# Patient Record
Sex: FEMALE | Race: BLACK OR AFRICAN AMERICAN | NOT HISPANIC OR LATINO | Employment: PART TIME | URBAN - METROPOLITAN AREA
[De-identification: names, ages, dates, MRNs, and addresses within clinical notes are randomized per-mention and may not be internally consistent; named-entity substitution may affect disease eponyms.]

---

## 2022-07-08 ENCOUNTER — OFFICE VISIT (OUTPATIENT)
Dept: FAMILY MEDICINE CLINIC | Facility: CLINIC | Age: 22
End: 2022-07-08

## 2022-07-08 ENCOUNTER — LAB (OUTPATIENT)
Dept: LAB | Facility: HOSPITAL | Age: 22
End: 2022-07-08

## 2022-07-08 VITALS
DIASTOLIC BLOOD PRESSURE: 70 MMHG | HEIGHT: 63 IN | WEIGHT: 151.2 LBS | OXYGEN SATURATION: 99 % | HEART RATE: 55 BPM | TEMPERATURE: 98.2 F | RESPIRATION RATE: 16 BRPM | SYSTOLIC BLOOD PRESSURE: 110 MMHG | BODY MASS INDEX: 26.79 KG/M2

## 2022-07-08 DIAGNOSIS — N92.0 MENORRHAGIA WITH REGULAR CYCLE: ICD-10-CM

## 2022-07-08 DIAGNOSIS — F51.04 PSYCHOPHYSIOLOGICAL INSOMNIA: ICD-10-CM

## 2022-07-08 DIAGNOSIS — Z76.89 ESTABLISHING CARE WITH NEW DOCTOR, ENCOUNTER FOR: Primary | ICD-10-CM

## 2022-07-08 DIAGNOSIS — F34.1 DYSTHYMIA: ICD-10-CM

## 2022-07-08 DIAGNOSIS — F41.9 ANXIETY: ICD-10-CM

## 2022-07-08 DIAGNOSIS — Z11.59 NEED FOR HEPATITIS C SCREENING TEST: ICD-10-CM

## 2022-07-08 DIAGNOSIS — Z00.00 ANNUAL PHYSICAL EXAM: ICD-10-CM

## 2022-07-08 LAB
ALBUMIN SERPL-MCNC: 4.8 G/DL (ref 3.5–5.2)
ALBUMIN/GLOB SERPL: 1.7 G/DL
ALP SERPL-CCNC: 52 U/L (ref 39–117)
ALT SERPL W P-5'-P-CCNC: 24 U/L (ref 1–33)
ANION GAP SERPL CALCULATED.3IONS-SCNC: 9.4 MMOL/L (ref 5–15)
AST SERPL-CCNC: 45 U/L (ref 1–32)
BASOPHILS # BLD AUTO: 0.04 10*3/MM3 (ref 0–0.2)
BASOPHILS NFR BLD AUTO: 0.8 % (ref 0–1.5)
BILIRUB SERPL-MCNC: 0.3 MG/DL (ref 0–1.2)
BILIRUB UR QL STRIP: NEGATIVE
BUN SERPL-MCNC: 8 MG/DL (ref 6–20)
BUN/CREAT SERPL: 11.3 (ref 7–25)
CALCIUM SPEC-SCNC: 9.6 MG/DL (ref 8.6–10.5)
CHLORIDE SERPL-SCNC: 103 MMOL/L (ref 98–107)
CHOLEST SERPL-MCNC: 178 MG/DL (ref 0–200)
CLARITY UR: ABNORMAL
CO2 SERPL-SCNC: 24.6 MMOL/L (ref 22–29)
COLOR UR: YELLOW
CREAT SERPL-MCNC: 0.71 MG/DL (ref 0.57–1)
DEPRECATED RDW RBC AUTO: 45.3 FL (ref 37–54)
EGFRCR SERPLBLD CKD-EPI 2021: 125 ML/MIN/1.73
EOSINOPHIL # BLD AUTO: 0.08 10*3/MM3 (ref 0–0.4)
EOSINOPHIL NFR BLD AUTO: 1.6 % (ref 0.3–6.2)
ERYTHROCYTE [DISTWIDTH] IN BLOOD BY AUTOMATED COUNT: 15.1 % (ref 12.3–15.4)
GLOBULIN UR ELPH-MCNC: 2.8 GM/DL
GLUCOSE SERPL-MCNC: 76 MG/DL (ref 65–99)
GLUCOSE UR STRIP-MCNC: NEGATIVE MG/DL
HCT VFR BLD AUTO: 38.4 % (ref 34–46.6)
HCV AB SER DONR QL: NORMAL
HDLC SERPL-MCNC: 67 MG/DL (ref 40–60)
HGB BLD-MCNC: 12.8 G/DL (ref 12–15.9)
HGB UR QL STRIP.AUTO: NEGATIVE
IMM GRANULOCYTES # BLD AUTO: 0.01 10*3/MM3 (ref 0–0.05)
IMM GRANULOCYTES NFR BLD AUTO: 0.2 % (ref 0–0.5)
IRON 24H UR-MRATE: 26 MCG/DL (ref 37–145)
KETONES UR QL STRIP: NEGATIVE
LDLC SERPL CALC-MCNC: 102 MG/DL (ref 0–100)
LDLC/HDLC SERPL: 1.53 {RATIO}
LEUKOCYTE ESTERASE UR QL STRIP.AUTO: NEGATIVE
LYMPHOCYTES # BLD AUTO: 1.99 10*3/MM3 (ref 0.7–3.1)
LYMPHOCYTES NFR BLD AUTO: 39.2 % (ref 19.6–45.3)
MCH RBC QN AUTO: 28.1 PG (ref 26.6–33)
MCHC RBC AUTO-ENTMCNC: 33.3 G/DL (ref 31.5–35.7)
MCV RBC AUTO: 84.4 FL (ref 79–97)
MONOCYTES # BLD AUTO: 0.49 10*3/MM3 (ref 0.1–0.9)
MONOCYTES NFR BLD AUTO: 9.6 % (ref 5–12)
NEUTROPHILS NFR BLD AUTO: 2.47 10*3/MM3 (ref 1.7–7)
NEUTROPHILS NFR BLD AUTO: 48.6 % (ref 42.7–76)
NITRITE UR QL STRIP: NEGATIVE
NRBC BLD AUTO-RTO: 0 /100 WBC (ref 0–0.2)
PH UR STRIP.AUTO: 6 [PH] (ref 5–8)
PLATELET # BLD AUTO: 291 10*3/MM3 (ref 140–450)
PMV BLD AUTO: 9.6 FL (ref 6–12)
POTASSIUM SERPL-SCNC: 4.1 MMOL/L (ref 3.5–5.2)
PROT SERPL-MCNC: 7.6 G/DL (ref 6–8.5)
PROT UR QL STRIP: NEGATIVE
RBC # BLD AUTO: 4.55 10*6/MM3 (ref 3.77–5.28)
SODIUM SERPL-SCNC: 137 MMOL/L (ref 136–145)
SP GR UR STRIP: 1.02 (ref 1–1.03)
TRIGL SERPL-MCNC: 43 MG/DL (ref 0–150)
TSH SERPL DL<=0.05 MIU/L-ACNC: 1.94 UIU/ML (ref 0.27–4.2)
UROBILINOGEN UR QL STRIP: ABNORMAL
VLDLC SERPL-MCNC: 9 MG/DL (ref 5–40)
WBC NRBC COR # BLD: 5.08 10*3/MM3 (ref 3.4–10.8)

## 2022-07-08 PROCEDURE — 83540 ASSAY OF IRON: CPT | Performed by: NURSE PRACTITIONER

## 2022-07-08 PROCEDURE — 81003 URINALYSIS AUTO W/O SCOPE: CPT | Performed by: NURSE PRACTITIONER

## 2022-07-08 PROCEDURE — 99385 PREV VISIT NEW AGE 18-39: CPT | Performed by: NURSE PRACTITIONER

## 2022-07-08 PROCEDURE — 80061 LIPID PANEL: CPT | Performed by: NURSE PRACTITIONER

## 2022-07-08 PROCEDURE — 86803 HEPATITIS C AB TEST: CPT | Performed by: NURSE PRACTITIONER

## 2022-07-08 PROCEDURE — 80050 GENERAL HEALTH PANEL: CPT | Performed by: NURSE PRACTITIONER

## 2022-07-08 RX ORDER — CITALOPRAM 10 MG/1
10 TABLET ORAL EVERY MORNING
Qty: 30 TABLET | Refills: 1 | Status: SHIPPED | OUTPATIENT
Start: 2022-07-08 | End: 2022-08-17 | Stop reason: SDUPTHER

## 2022-07-08 RX ORDER — HYDROXYZINE HYDROCHLORIDE 25 MG/1
25 TABLET, FILM COATED ORAL NIGHTLY PRN
Qty: 30 TABLET | Refills: 2 | Status: SHIPPED | OUTPATIENT
Start: 2022-07-08 | End: 2022-10-25 | Stop reason: SDUPTHER

## 2022-07-08 NOTE — PROGRESS NOTES
Follow Up Office Note     Patient Name: Saba Brown  : 2000   MRN: [id number]     Chief Complaint:    Chief Complaint   Patient presents with   • Establish Care     Pt wants to discuss medication. SERTRALINE HCL       History of Present Illness: Saba Brown is a 21 y.o. female who presents today to establish care with a new PCP and for annual physical exam.  Patient has chronic anxiety and depression. She states that her previous PCP prescribed Sertraline for her this past November. She states that she started at 50mg/day and then it was increased to 100 mg/day 4 weeks later. Patient states that the Sertraline made her stomach hurt and she was unable to eat so she discontinued taking it. She would like to discuss trying a different medication to treat her symptoms.   Patient also c/o difficulty sleeping.      Subjective      Review of Systems:   Review of Systems   Constitutional: Negative.    HENT: Negative.         Last dental exam last week.   Eyes: Negative.         Exam exam up to date.   Respiratory: Negative.    Cardiovascular: Negative.    Gastrointestinal: Negative.    Genitourinary: Positive for menstrual problem (heavy menses with regular cycle). Negative for dysuria, flank pain, hematuria, pelvic pain and urgency.   Musculoskeletal: Negative.    Skin: Negative.    Allergic/Immunologic: Negative.    Neurological: Negative.    Hematological: Negative for adenopathy. Does not bruise/bleed easily.   Psychiatric/Behavioral: Positive for sleep disturbance. Negative for dysphoric mood and suicidal ideas. The patient is nervous/anxious.         Past Medical History:   Past Medical History:   Diagnosis Date   • Anxiety    • Depression    • Insomnia      LMP:  22    Medications:     Current Outpatient Medications:   •  citalopram (CeleXA) 10 MG tablet, Take 1 tablet by mouth Every Morning., Disp: 30 tablet, Rfl: 1  •  hydrOXYzine (ATARAX) 25 MG tablet, Take 1 tablet by mouth At Night As  "Needed for Anxiety (and/or insomnia)., Disp: 30 tablet, Rfl: 2    Allergies:   No Known Allergies    PHQ-2/PHQ-9 Depression Screening 7/8/2022   Little Interest or Pleasure in Doing Things 0-->not at all   Feeling Down, Depressed or Hopeless 0-->not at all   PHQ-9: Brief Depression Severity Measure Score 0     ISIDRO-7  Feeling nervous, anxious or on edge: Nearly every day  Not being able to stop or control worrying: Nearly every day  Worrying too much about different things: Nearly every day  Trouble Relaxing: More than half the days  Being so restless that it is hard to sit still: More than half the days  Feeling afraid as if something awful might happen: Not at all  Becoming easily annoyed or irritable: Nearly every day  ISIDRO 7 Total Score: 16  If you checked any problems, how difficult have these problems made it for you to do your work, take care of things at home, or get along with other people: Very difficult      .  Objective     Physical Exam:  Vital Signs:   Vitals:    07/08/22 1022   BP: 110/70   BP Location: Right arm   Patient Position: Sitting   Cuff Size: Adult   Pulse: 55   Resp: 16   Temp: 98.2 °F (36.8 °C)   TempSrc: Temporal   SpO2: 99%   Weight: 68.6 kg (151 lb 3.2 oz)   Height: 159.5 cm (62.8\")     Body mass index is 26.96 kg/m².     Physical Exam  Vitals and nursing note reviewed. Exam conducted with a chaperone present.   Constitutional:       General: She is not in acute distress.     Appearance: Normal appearance. She is well-developed and well-groomed. She is not ill-appearing, toxic-appearing or diaphoretic.   HENT:      Head: Normocephalic and atraumatic.      Right Ear: Tympanic membrane, ear canal and external ear normal.      Left Ear: Tympanic membrane, ear canal and external ear normal.      Nose: Nose normal.      Mouth/Throat:      Lips: Pink.      Mouth: Mucous membranes are moist.      Pharynx: Oropharynx is clear. Uvula midline. No posterior oropharyngeal erythema.   Neck:      " Thyroid: No thyroid mass, thyromegaly or thyroid tenderness.   Cardiovascular:      Rate and Rhythm: Normal rate and regular rhythm.      Pulses: Normal pulses.      Heart sounds: Normal heart sounds, S1 normal and S2 normal. No murmur heard.  Pulmonary:      Effort: Pulmonary effort is normal. No respiratory distress.      Breath sounds: Normal breath sounds.   Abdominal:      General: Bowel sounds are normal. There is no distension.      Palpations: Abdomen is soft.      Tenderness: There is no abdominal tenderness.   Musculoskeletal:         General: Normal range of motion.      Cervical back: Normal range of motion and neck supple.      Right lower leg: No edema.      Left lower leg: No edema.   Lymphadenopathy:      Cervical: No cervical adenopathy.   Skin:     General: Skin is warm and dry.      Capillary Refill: Capillary refill takes less than 2 seconds.      Findings: No rash.   Neurological:      General: No focal deficit present.      Mental Status: She is alert and oriented to person, place, and time.   Psychiatric:         Attention and Perception: Attention and perception normal.         Mood and Affect: Mood and affect normal.         Speech: Speech normal.         Behavior: Behavior normal. Behavior is cooperative.         Thought Content: Thought content normal.         Cognition and Memory: Cognition and memory normal.         Judgment: Judgment normal.         Assessment / Plan      Assessment/Plan:   Diagnoses and all orders for this visit:    1. Establishing care with new doctor, encounter for (Primary)    2. Annual physical exam  -     Comprehensive Metabolic Panel; Future  -     CBC Auto Differential; Future  -     TSH; Future  -     Lipid Panel; Future  -     Iron; Future  -     Urinalysis With Culture If Indicated - Urine, Clean Catch; Future    3. Anxiety  -     citalopram (CeleXA) 10 MG tablet; Take 1 tablet by mouth Every Morning.  Dispense: 30 tablet; Refill: 1  -     hydrOXYzine  (ATARAX) 25 MG tablet; Take 1 tablet by mouth At Night As Needed for Anxiety (and/or insomnia).  Dispense: 30 tablet; Refill: 2    Assessment and Plan:        -Chronic anxiety worsening recently.  Medication therapy per orders.  Discussed referral to behavioral health services, patient declined at this time.  Follow-up in 4 to 6 weeks.    4. Dysthymia  -     citalopram (CeleXA) 10 MG tablet; Take 1 tablet by mouth Every Morning.  Dispense: 30 tablet; Refill: 1    5. Psychophysiological insomnia  -     hydrOXYzine (ATARAX) 25 MG tablet; Take 1 tablet by mouth At Night As Needed for Anxiety (and/or insomnia).  Dispense: 30 tablet; Refill: 2    6. Menorrhagia with regular cycle  -     CBC Auto Differential; Future  -     Iron; Future    7. Need for hepatitis C screening test  -     Hepatitis C Antibody; Future       Follow Up:   PRN and at next scheduled appointment(s) with PCP.    Discussed the nature of the medical condition(s) risks, complications, implications, management, safe and proper use of medications. Encouraged medication compliance, and keeping scheduled follow up appointments with me and any other providers.      The patient is here for a health maintenance visit.  Currently, the patient consumes a mostly healthy diet and has an adequate exercise regimen. Screening lab work is ordered.  Immunizations are declined today and vaccine education is provided.  Advice and education is given regarding nutrition, aerobic exercise, routine dental evaluations, routine eye exams, reproductive health, cardiovascular risk reduction, sunscreen use, self skin examination (annual dermatology evaluations) and seat belt use (general overall safety).  Further recommendations after lab evaluation.  Annual wellness evaluations recommended.      RIVER Branham  [facility] [place]

## 2022-07-09 NOTE — ASSESSMENT & PLAN NOTE
Chronic anxiety worsening recently.  Medication therapy per orders.  Discussed referral to behavioral health services, patient declined at this time.  Follow-up in 4 to 6 weeks.

## 2022-08-04 ENCOUNTER — TELEPHONE (OUTPATIENT)
Dept: FAMILY MEDICINE CLINIC | Facility: CLINIC | Age: 22
End: 2022-08-04

## 2022-08-04 NOTE — TELEPHONE ENCOUNTER
CALLED PT, NO ANSWER, LEFT DETAILED VM THAT FORMS WERE READY FOR PICK.  AT .  NEED TO CHECK ON FORM FEE

## 2022-08-17 DIAGNOSIS — F34.1 DYSTHYMIA: ICD-10-CM

## 2022-08-17 DIAGNOSIS — F41.9 ANXIETY: ICD-10-CM

## 2022-08-17 RX ORDER — CITALOPRAM 10 MG/1
10 TABLET ORAL EVERY MORNING
Qty: 30 TABLET | Refills: 1 | Status: CANCELLED | OUTPATIENT
Start: 2022-08-17

## 2022-08-17 RX ORDER — CITALOPRAM 10 MG/1
10 TABLET ORAL EVERY MORNING
Qty: 30 TABLET | Refills: 1 | Status: SHIPPED | OUTPATIENT
Start: 2022-08-17 | End: 2022-08-19 | Stop reason: DRUGHIGH

## 2022-08-19 ENCOUNTER — OFFICE VISIT (OUTPATIENT)
Dept: FAMILY MEDICINE CLINIC | Facility: CLINIC | Age: 22
End: 2022-08-19

## 2022-08-19 VITALS
HEART RATE: 58 BPM | HEIGHT: 63 IN | WEIGHT: 144.8 LBS | OXYGEN SATURATION: 99 % | TEMPERATURE: 98.2 F | SYSTOLIC BLOOD PRESSURE: 102 MMHG | DIASTOLIC BLOOD PRESSURE: 68 MMHG | BODY MASS INDEX: 25.66 KG/M2

## 2022-08-19 DIAGNOSIS — F34.1 DYSTHYMIA: Chronic | ICD-10-CM

## 2022-08-19 DIAGNOSIS — F41.9 ANXIETY: Primary | Chronic | ICD-10-CM

## 2022-08-19 PROCEDURE — 99214 OFFICE O/P EST MOD 30 MIN: CPT | Performed by: NURSE PRACTITIONER

## 2022-08-19 RX ORDER — CITALOPRAM 20 MG/1
20 TABLET ORAL DAILY
Qty: 30 TABLET | Refills: 2 | Status: SHIPPED | OUTPATIENT
Start: 2022-08-19 | End: 2022-10-25 | Stop reason: SDUPTHER

## 2022-08-19 NOTE — PROGRESS NOTES
Follow Up Office Note     Patient Name: Saba Brown  : 2000   MRN: [id number]     Chief Complaint:    Chief Complaint   Patient presents with   • Follow-up     6 week follow up    • Anxiety   • Depression       History of Present Illness: Saba Brown is a 21 y.o. female who presents today for re-evaluation/mangement chronic anxiety and depression. Patient was started on citalopram 10 mg for management at her last OV 22.  Patient reports compliance with medication and states that she has had no side effects or adverse reactions. She states that the medication has significantly improved her symptoms, however she is still experiencing some anxiety.      Subjective      Review of Systems:   Review of Systems   Constitutional: Negative.    Respiratory: Negative.    Cardiovascular: Negative.    Neurological: Negative.    Psychiatric/Behavioral: Negative for dysphoric mood and suicidal ideas. The patient is nervous/anxious.         Past Medical History:   Past Medical History:   Diagnosis Date   • Anxiety    • Depression    • Insomnia          Medications:     Current Outpatient Medications:   •  hydrOXYzine (ATARAX) 25 MG tablet, Take 1 tablet by mouth At Night As Needed for Anxiety (and/or insomnia)., Disp: 30 tablet, Rfl: 2  •  citalopram (CeleXA) 20 MG tablet, Take 1 tablet by mouth Daily., Disp: 30 tablet, Rfl: 2    Allergies:   No Known Allergies    PHQ-2/PHQ-9 Depression Screening 2022   Little Interest or Pleasure in Doing Things 0-->not at all   Feeling Down, Depressed or Hopeless 0-->not at all   PHQ-9: Brief Depression Severity Measure Score 0     ISIDRO-7  Feeling nervous, anxious or on edge: Nearly every day  Not being able to stop or control worrying: Nearly every day  Worrying too much about different things: Nearly every day  Trouble Relaxing: Several days  Being so restless that it is hard to sit still: Not at all  Becoming easily annoyed or irritable: Not at all  ISIDRO 7 Total Score:  "10  If you checked any problems, how difficult have these problems made it for you to do your work, take care of things at home, or get along with other people: Very difficult        Objective     Physical Exam:  Vital Signs:   Vitals:    08/19/22 1011   BP: 102/68   BP Location: Right arm   Patient Position: Sitting   Cuff Size: Small Adult   Pulse: 58   Temp: 98.2 °F (36.8 °C)   TempSrc: Infrared   SpO2: 99%   Weight: 65.7 kg (144 lb 12.8 oz)   Height: 159.5 cm (62.8\")   PainSc: 0-No pain     Body mass index is 25.81 kg/m².     Physical Exam  Vitals and nursing note reviewed.   Constitutional:       General: She is not in acute distress.     Appearance: Normal appearance. She is well-developed. She is not ill-appearing, toxic-appearing or diaphoretic.   HENT:      Head: Normocephalic and atraumatic.   Cardiovascular:      Rate and Rhythm: Normal rate and regular rhythm.      Heart sounds: Normal heart sounds.   Pulmonary:      Effort: Pulmonary effort is normal. No respiratory distress.      Breath sounds: Normal breath sounds. No stridor. No wheezing.   Skin:     General: Skin is warm and dry.   Neurological:      General: No focal deficit present.      Mental Status: She is alert and oriented to person, place, and time.   Psychiatric:         Attention and Perception: Attention and perception normal.         Mood and Affect: Mood and affect normal.         Speech: Speech normal.         Behavior: Behavior normal. Behavior is cooperative.         Thought Content: Thought content normal.         Cognition and Memory: Cognition and memory normal.         Judgment: Judgment normal.         Assessment / Plan      Assessment/Plan:   Diagnoses and all orders for this visit:    1. Anxiety (Primary)  Assessment & Plan:  Anxiety improving with treatment, but still not controlled.  Increase Citalopram dosage from 10 mg/day to 20 mg/day. Continue hydroxyzine.    Orders:  -     citalopram (CeleXA) 20 MG tablet; Take 1 tablet " by mouth Daily.  Dispense: 30 tablet; Refill: 2    2. Dysthymia  Assessment & Plan:  Patient's depression is well-controlled on current medication. Continue current treatment plan.    Orders:  -     citalopram (CeleXA) 20 MG tablet; Take 1 tablet by mouth Daily.  Dispense: 30 tablet; Refill: 2         Follow Up:   PRN and at next scheduled appointment(s) with PCP.    Discussed the nature of the medical condition(s) risks, complications, implications, management, safe and proper use of medications. Encouraged medication compliance, and keeping scheduled follow up appointments with me and any other providers.      RTC if symptoms fail to improve, to ER if symptoms worsen.        *Dictated Utilizing Dragon Dictation   Please note that portions of this note were completed with a voice recognition program.   Part of this note may be an electronic transcription/translation of spoken language to printed text using the Dragon Dictation System.          RIVER Branham  [facility] [place]

## 2022-08-21 NOTE — ASSESSMENT & PLAN NOTE
Anxiety improving with treatment, but still not controlled.  Increase Citalopram dosage from 10 mg/day to 20 mg/day. Continue hydroxyzine.

## 2022-10-25 ENCOUNTER — OFFICE VISIT (OUTPATIENT)
Dept: FAMILY MEDICINE CLINIC | Facility: CLINIC | Age: 22
End: 2022-10-25

## 2022-10-25 VITALS
TEMPERATURE: 98.7 F | SYSTOLIC BLOOD PRESSURE: 122 MMHG | DIASTOLIC BLOOD PRESSURE: 86 MMHG | WEIGHT: 145 LBS | BODY MASS INDEX: 25.69 KG/M2 | HEIGHT: 63 IN | OXYGEN SATURATION: 99 % | HEART RATE: 51 BPM

## 2022-10-25 DIAGNOSIS — Z76.0 MEDICATION REFILL: ICD-10-CM

## 2022-10-25 DIAGNOSIS — F51.04 PSYCHOPHYSIOLOGICAL INSOMNIA: ICD-10-CM

## 2022-10-25 DIAGNOSIS — F41.9 ANXIETY: Primary | ICD-10-CM

## 2022-10-25 DIAGNOSIS — F39 MOOD DISORDER: ICD-10-CM

## 2022-10-25 DIAGNOSIS — F34.1 DYSTHYMIA: Chronic | ICD-10-CM

## 2022-10-25 PROCEDURE — 99214 OFFICE O/P EST MOD 30 MIN: CPT | Performed by: NURSE PRACTITIONER

## 2022-10-25 RX ORDER — HYDROXYZINE HYDROCHLORIDE 25 MG/1
25 TABLET, FILM COATED ORAL NIGHTLY PRN
Qty: 30 TABLET | Refills: 2 | Status: SHIPPED | OUTPATIENT
Start: 2022-10-25

## 2022-10-25 RX ORDER — CITALOPRAM 20 MG/1
20 TABLET ORAL DAILY
Qty: 30 TABLET | Refills: 2 | Status: SHIPPED | OUTPATIENT
Start: 2022-10-25

## 2022-10-25 RX ORDER — QUETIAPINE FUMARATE 25 MG/1
25 TABLET, FILM COATED ORAL NIGHTLY
Qty: 30 TABLET | Refills: 1 | Status: SHIPPED | OUTPATIENT
Start: 2022-10-25 | End: 2023-01-29 | Stop reason: SDUPTHER

## 2022-10-25 NOTE — PROGRESS NOTES
"     Follow Up Office Note     Patient Name: Saba Brown  : 2000   MRN: [id number]     Chief Complaint:    Chief Complaint   Patient presents with   • Follow-up     2 month follow up    • Anxiety   • Depression       History of Present Illness: Saba Brown is a 21 y.o. female who presents today for reevaluation and management of chronic anxiety and depression.  Patient states that her anxiety and depressive symptoms have significantly improved since starting citalopram and hydroxyzine.  She states that she has noticed some issues with agitation and irritability recently especially if she forgets to take a dose of her medication.  She is also complaining of difficulty sleeping.      Subjective      Review of Systems:   Review of Systems   Constitutional: Negative.    Respiratory: Negative.    Cardiovascular: Negative.    Neurological: Negative.    Psychiatric/Behavioral: Positive for agitation, dysphoric mood (Patient rates her depression as a \"3\" on a 1-10 scale with 10 being the worst.) and sleep disturbance. Negative for behavioral problems, self-injury and suicidal ideas. The patient is nervous/anxious (Patient rates her anxiety symptoms as a \"4-5\" on a 1-10 scale with 10 being the worst.  Prior to starting the medication she rated her anxiety as a 10.). The patient is not hyperactive.         Past Medical History:   Past Medical History:   Diagnosis Date   • Anxiety    • Depression    • Insomnia      Patient's last menstrual period was 10/24/2022.    Medications:     Current Outpatient Medications:   •  citalopram (CeleXA) 20 MG tablet, Take 1 tablet by mouth Daily., Disp: 30 tablet, Rfl: 2  •  hydrOXYzine (ATARAX) 25 MG tablet, Take 1 tablet by mouth At Night As Needed for Anxiety (and/or insomnia)., Disp: 30 tablet, Rfl: 2  •  QUEtiapine (SEROquel) 25 MG tablet, Take 1 tablet by mouth Every Night., Disp: 30 tablet, Rfl: 1    Allergies:   No Known Allergies      Objective     Physical Exam:  Vital " "Signs:   Vitals:    10/25/22 1324   BP: 122/86   BP Location: Left arm   Patient Position: Sitting   Cuff Size: Small Adult   Pulse: 51   Temp: 98.7 °F (37.1 °C)   TempSrc: Infrared   SpO2: 99%   Weight: 65.8 kg (145 lb)   Height: 159.5 cm (62.8\")   PainSc: 0-No pain     Body mass index is 25.85 kg/m².     Physical Exam  Vitals and nursing note reviewed.   Constitutional:       General: She is not in acute distress.     Appearance: Normal appearance. She is well-developed. She is not ill-appearing, toxic-appearing or diaphoretic.   HENT:      Head: Normocephalic and atraumatic.   Cardiovascular:      Rate and Rhythm: Normal rate and regular rhythm.      Heart sounds: Normal heart sounds.   Pulmonary:      Effort: Pulmonary effort is normal. No respiratory distress.      Breath sounds: Normal breath sounds. No stridor. No wheezing.   Skin:     General: Skin is warm and dry.   Neurological:      General: No focal deficit present.      Mental Status: She is alert and oriented to person, place, and time.   Psychiatric:         Attention and Perception: Attention and perception normal.         Mood and Affect: Mood and affect normal.         Speech: Speech normal.         Behavior: Behavior normal. Behavior is cooperative.         Thought Content: Thought content normal.         Cognition and Memory: Cognition and memory normal.         Judgment: Judgment normal.         Assessment / Plan      Assessment/Plan:   Diagnoses and all orders for this visit:    1. Anxiety (Primary)  Assessment & Plan:  Anxiety symptoms improving with medication therapy.  Continue current treatment plan.    Orders:  -     hydrOXYzine (ATARAX) 25 MG tablet; Take 1 tablet by mouth At Night As Needed for Anxiety (and/or insomnia).  Dispense: 30 tablet; Refill: 2  -     citalopram (CeleXA) 20 MG tablet; Take 1 tablet by mouth Daily.  Dispense: 30 tablet; Refill: 2    2. Dysthymia  -     citalopram (CeleXA) 20 MG tablet; Take 1 tablet by mouth " Daily.  Dispense: 30 tablet; Refill: 2    3. Mood disorder (HCC)  Assessment & Plan:  Patient complaining of episodes of agitation and irritability.  We will begin trial of Seroquel.    Orders:  -     QUEtiapine (SEROquel) 25 MG tablet; Take 1 tablet by mouth Every Night.  Dispense: 30 tablet; Refill: 1    4. Psychophysiological insomnia  -     QUEtiapine (SEROquel) 25 MG tablet; Take 1 tablet by mouth Every Night.  Dispense: 30 tablet; Refill: 1  -     hydrOXYzine (ATARAX) 25 MG tablet; Take 1 tablet by mouth At Night As Needed for Anxiety (and/or insomnia).  Dispense: 30 tablet; Refill: 2    5. Medication refill  -     hydrOXYzine (ATARAX) 25 MG tablet; Take 1 tablet by mouth At Night As Needed for Anxiety (and/or insomnia).  Dispense: 30 tablet; Refill: 2  -     citalopram (CeleXA) 20 MG tablet; Take 1 tablet by mouth Daily.  Dispense: 30 tablet; Refill: 2         Follow Up:   PRN and at next scheduled appointment(s) with PCP.    Discussed the nature of the medical condition(s) risks, complications, implications, management, safe and proper use of medications. Encouraged medication compliance, and keeping scheduled follow up appointments with me and any other providers.      RTC if symptoms fail to improve, to ER if symptoms worsen.        *Dictated Utilizing Dragon Dictation   Please note that portions of this note were completed with a voice recognition program.   Part of this note may be an electronic transcription/translation of spoken language to printed text using the Dragon Dictation System.          RIVER Branham  [facility] [place]

## 2022-10-26 PROBLEM — F39 MOOD DISORDER: Status: ACTIVE | Noted: 2022-10-26

## 2023-01-29 DIAGNOSIS — F51.04 PSYCHOPHYSIOLOGICAL INSOMNIA: ICD-10-CM

## 2023-01-29 DIAGNOSIS — F39 MOOD DISORDER: ICD-10-CM

## 2023-01-30 RX ORDER — QUETIAPINE FUMARATE 25 MG/1
25 TABLET, FILM COATED ORAL NIGHTLY
Qty: 30 TABLET | Refills: 1 | Status: SHIPPED | OUTPATIENT
Start: 2023-01-30

## 2023-07-28 ENCOUNTER — OFFICE VISIT (OUTPATIENT)
Dept: FAMILY MEDICINE CLINIC | Facility: CLINIC | Age: 23
End: 2023-07-28
Payer: COMMERCIAL

## 2023-07-28 VITALS
DIASTOLIC BLOOD PRESSURE: 70 MMHG | OXYGEN SATURATION: 98 % | BODY MASS INDEX: 27.71 KG/M2 | HEIGHT: 63 IN | TEMPERATURE: 98.4 F | WEIGHT: 156.4 LBS | SYSTOLIC BLOOD PRESSURE: 117 MMHG | HEART RATE: 77 BPM

## 2023-07-28 DIAGNOSIS — F34.1 DYSTHYMIA: Chronic | ICD-10-CM

## 2023-07-28 DIAGNOSIS — F39 MOOD DISORDER: Primary | ICD-10-CM

## 2023-07-28 DIAGNOSIS — F41.9 ANXIETY: ICD-10-CM

## 2023-07-28 DIAGNOSIS — F51.04 PSYCHOPHYSIOLOGICAL INSOMNIA: ICD-10-CM

## 2023-07-28 DIAGNOSIS — Z76.0 MEDICATION REFILL: ICD-10-CM

## 2023-07-28 PROCEDURE — 99214 OFFICE O/P EST MOD 30 MIN: CPT | Performed by: NURSE PRACTITIONER

## 2023-07-28 RX ORDER — CITALOPRAM 20 MG/1
20 TABLET ORAL DAILY
Qty: 30 TABLET | Refills: 2 | Status: SHIPPED | OUTPATIENT
Start: 2023-07-28

## 2023-07-28 RX ORDER — QUETIAPINE FUMARATE 25 MG/1
50 TABLET, FILM COATED ORAL NIGHTLY
Qty: 60 TABLET | Refills: 2 | Status: SHIPPED | OUTPATIENT
Start: 2023-07-28

## 2023-07-31 PROBLEM — G47.00 INSOMNIA: Status: ACTIVE | Noted: 2023-07-31

## 2023-07-31 PROBLEM — G47.00 INSOMNIA: Chronic | Status: ACTIVE | Noted: 2023-07-31

## 2023-09-03 DIAGNOSIS — Z76.0 MEDICATION REFILL: ICD-10-CM

## 2023-09-03 DIAGNOSIS — F51.04 PSYCHOPHYSIOLOGICAL INSOMNIA: ICD-10-CM

## 2023-09-03 DIAGNOSIS — F39 MOOD DISORDER: ICD-10-CM

## 2023-09-03 DIAGNOSIS — F41.9 ANXIETY: ICD-10-CM

## 2023-09-05 NOTE — TELEPHONE ENCOUNTER
Rx Refill Note  Requested Prescriptions     Pending Prescriptions Disp Refills    hydrOXYzine (ATARAX) 25 MG tablet 30 tablet 2     Sig: Take 1 tablet by mouth At Night As Needed for Anxiety (and/or insomnia).    QUEtiapine (SEROquel) 25 MG tablet 60 tablet 2     Sig: Take 2 tablets by mouth Every Night.      Last office visit with prescribing clinician: 7/28/2023   Last telemedicine visit with prescribing clinician: Visit date not found   Next office visit with prescribing clinician: Visit date not found                         Would you like a call back once the refill request has been completed: [] Yes [] No    If the office needs to give you a call back, can they leave a voicemail: [] Yes [] No    Suzy Saab MA  09/05/23, 09:16 EDT

## 2023-09-06 RX ORDER — HYDROXYZINE HYDROCHLORIDE 25 MG/1
25 TABLET, FILM COATED ORAL NIGHTLY PRN
Qty: 30 TABLET | Refills: 2 | Status: SHIPPED | OUTPATIENT
Start: 2023-09-06

## 2023-09-06 RX ORDER — QUETIAPINE FUMARATE 25 MG/1
50 TABLET, FILM COATED ORAL NIGHTLY
Qty: 60 TABLET | Refills: 2 | Status: SHIPPED | OUTPATIENT
Start: 2023-09-06

## 2023-09-22 ENCOUNTER — TELEMEDICINE (OUTPATIENT)
Dept: PSYCHIATRY | Facility: CLINIC | Age: 23
End: 2023-09-22
Payer: COMMERCIAL

## 2023-09-22 DIAGNOSIS — F41.1 GENERALIZED ANXIETY DISORDER: ICD-10-CM

## 2023-09-22 DIAGNOSIS — F39 MOOD DISORDER: Primary | ICD-10-CM

## 2023-09-22 NOTE — PROGRESS NOTES
"This provider is located at the [facility] (through [facility]), [address], [place] [address] using a secure [employer] Video Visit through [employer]. Patient is being seen remotely via telehealth at home address in [place] and stated they are in a secure environment for this session. The patient's condition being diagnosed/treated is appropriate for telemedicine. The provider identified herself as well as her credentials. The patient, and/or patients guardian, consent to be seen remotely, and when consent is given they understand that the consent allows for patient identifiable information to be sent to a third party as needed. They may refuse to be seen remotely at any time. The electronic data is encrypted and password protected, and the patient and/or guardian has been advised of the potential risks to privacy not withstanding such measures.     You have chosen to receive care through a telehealth visit.  Do you consent to use a video/audio connection for your medical care today? Yes    Subjective   Saba Brown is a 22 y.o. female who presents today for initial evaluation  Patient stated over the past few years she has struggled to process her emotions. Patient stated in high school she was struggling with her sexual identify and her parents forced her she attend therapy. Patient stated it started her freshman year of high school when her parents learned patient was interested in other woman. Patient stated her parents removed her from school and when she returned had her aunt monitoring her at school. Patient stated her parents ultimately decided to remove patient from the school and placed her in another school. Patient stated her mother's consumption of alcohol increased. Patient stated her mother also question if patient wanted to \"be a boy.\" Patient stated her parents placed Rastafari pressure on her to change. Patient attempted to control patient's social " interactions with peers, the way patient dressed, and behaviors. Patient expressed she feels she has built up anger due to this control. Patient stated she felt like she has to maintain two identities at home and at school. Patient stated it impacted her desire to play basketball but her parents pressured her to maintain playing. Patient reported that her mood fluctuates daily. Patient stated she would like to learn how to manage her emotions and gain stability. Patient expressed interest in medication management.       Time In: 11:02  Time Out: 11:38  Name of PCP: Ayla Bustos   Referral source: Ayla Bustos    Chief Complaint:  anxiety, mood instability      Patient adamantly and convincingly denies current suicidal or homicidal ideation or perceptual disturbance.    Childhood Experiences:   Has patient experienced a major accident or tragic events as a child? no      Has patient experienced any other significant life events or trauma (such as verbal, physical, sexual abuse)? Yes, patient's parents began to engage in controlling behaviors when they learned patient was ashton.       Significant Life Events:  Has patient been through or witnessed a divorce? no      Has patient experienced a death / loss of relationship? no      Has patient experienced a major accident or tragic events? no      Has patient experienced any other significant life events or trauma (such as verbal, physical, sexual abuse)? no    Social History:   Social History     Socioeconomic History    Marital status: Single   Tobacco Use    Smoking status: Never    Smokeless tobacco: Never   Vaping Use    Vaping Use: Every day    Substances: Nicotine    Devices: Disposable   Substance and Sexual Activity    Alcohol use: Yes     Alcohol/week: 2.0 standard drinks     Types: 2 Shots of liquor per week     Comment: SOC    Drug use: Never    Sexual activity: Yes     Partners: Female     Birth control/protection: Same-sex partner     Marital Status:  "single    Patient's current living situation: Patient lives on campus at her University housing with a roommate that is also a team mate on the basketball team    Support system:  parents and roommate    Difficulty getting along with peers: no    Difficulty making new friendships: no    Difficulty maintaining friendships: no    Close with family members: somewhat    Religous: yes, Sabianism (Yazidi)    Work History:  Highest level of education obtained: currently enrolled, pursuing a Psych major with a bio minor     Ever been active duty in the ? 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      Legal History:  The patient has no significant history of legal issues.    Past Medical History:  Past Medical History:   Diagnosis Date    Anxiety     Depression     Insomnia        Past Surgical History:  Past Surgical History:   Procedure Laterality Date    MOUTH SURGERY      TOE AMPUTATION      PT. WAS BORN WITH EXTRA TOE       Physical Exam:   not currently breastfeeding.   There is no height or weight on file to calculate BMI.     History of prior treatment or hospitalization: Patient was in therapy by choice previously for a couple of months in 2020. Patient stated she was unable maintain scheduled appointments. Patient stated she was previously \"forced\" to attend therapy. Patient stated her parents were not approving of her sexuality and made patient attend therapy. Patient stated though she was forced to attend it did not impact her perspective negatively towards therapy. Patient stated she is currently taking some medication to treat her mental health.    Are there any significant health issues (current or past): no    History of seizures: no    Allergy:   No Known Allergies     Current Medications:   Current Outpatient Medications   Medication Sig Dispense Refill    " citalopram (CeleXA) 20 MG tablet Take 1 tablet by mouth Daily. 30 tablet 2    hydrOXYzine (ATARAX) 25 MG tablet Take 1 tablet by mouth At Night As Needed for Anxiety (and/or insomnia). 30 tablet 2    QUEtiapine (SEROquel) 25 MG tablet Take 2 tablets by mouth Every Night. 60 tablet 2     No current facility-administered medications for this visit.       Lab Results:   No visits with results within 1 Month(s) from this visit.   Latest known visit with results is:   Office Visit on 07/08/2022   Component Date Value Ref Range Status    Glucose 07/08/2022 76  65 - 99 mg/dL Final    BUN 07/08/2022 8  6 - 20 mg/dL Final    Creatinine 07/08/2022 0.71  0.57 - 1.00 mg/dL Final    Sodium 07/08/2022 137  136 - 145 mmol/L Final    Potassium 07/08/2022 4.1  3.5 - 5.2 mmol/L Final    Chloride 07/08/2022 103  98 - 107 mmol/L Final    CO2 07/08/2022 24.6  22.0 - 29.0 mmol/L Final    Calcium 07/08/2022 9.6  8.6 - 10.5 mg/dL Final    Total Protein 07/08/2022 7.6  6.0 - 8.5 g/dL Final    Albumin 07/08/2022 4.80  3.50 - 5.20 g/dL Final    ALT (SGPT) 07/08/2022 24  1 - 33 U/L Final    AST (SGOT) 07/08/2022 45 (H)  1 - 32 U/L Final    Alkaline Phosphatase 07/08/2022 52  39 - 117 U/L Final    Total Bilirubin 07/08/2022 0.3  0.0 - 1.2 mg/dL Final    Globulin 07/08/2022 2.8  gm/dL Final    A/G Ratio 07/08/2022 1.7  g/dL Final    BUN/Creatinine Ratio 07/08/2022 11.3  7.0 - 25.0 Final    Anion Gap 07/08/2022 9.4  5.0 - 15.0 mmol/L Final    eGFR 07/08/2022 125.0  >60.0 mL/min/1.73 Final    National Kidney Foundation and American Society of Nephrology (ASN) Task Force recommended calculation based on the Chronic Kidney Disease Epidemiology Collaboration (CKD-EPI) equation refit without adjustment for race.    WBC 07/08/2022 5.08  3.40 - 10.80 10*3/mm3 Final    RBC 07/08/2022 4.55  3.77 - 5.28 10*6/mm3 Final    Hemoglobin 07/08/2022 12.8  12.0 - 15.9 g/dL Final    Hematocrit 07/08/2022 38.4  34.0 - 46.6 % Final    MCV 07/08/2022 84.4  79.0 - 97.0  fL Final    MCH 07/08/2022 28.1  26.6 - 33.0 pg Final    MCHC 07/08/2022 33.3  31.5 - 35.7 g/dL Final    RDW 07/08/2022 15.1  12.3 - 15.4 % Final    RDW-SD 07/08/2022 45.3  37.0 - 54.0 fl Final    MPV 07/08/2022 9.6  6.0 - 12.0 fL Final    Platelets 07/08/2022 291  140 - 450 10*3/mm3 Final    Neutrophil % 07/08/2022 48.6  42.7 - 76.0 % Final    Lymphocyte % 07/08/2022 39.2  19.6 - 45.3 % Final    Monocyte % 07/08/2022 9.6  5.0 - 12.0 % Final    Eosinophil % 07/08/2022 1.6  0.3 - 6.2 % Final    Basophil % 07/08/2022 0.8  0.0 - 1.5 % Final    Immature Grans % 07/08/2022 0.2  0.0 - 0.5 % Final    Neutrophils, Absolute 07/08/2022 2.47  1.70 - 7.00 10*3/mm3 Final    Lymphocytes, Absolute 07/08/2022 1.99  0.70 - 3.10 10*3/mm3 Final    Monocytes, Absolute 07/08/2022 0.49  0.10 - 0.90 10*3/mm3 Final    Eosinophils, Absolute 07/08/2022 0.08  0.00 - 0.40 10*3/mm3 Final    Basophils, Absolute 07/08/2022 0.04  0.00 - 0.20 10*3/mm3 Final    Immature Grans, Absolute 07/08/2022 0.01  0.00 - 0.05 10*3/mm3 Final    nRBC 07/08/2022 0.0  0.0 - 0.2 /100 WBC Final    TSH 07/08/2022 1.940  0.270 - 4.200 uIU/mL Final    Total Cholesterol 07/08/2022 178  0 - 200 mg/dL Final    Triglycerides 07/08/2022 43  0 - 150 mg/dL Final    HDL Cholesterol 07/08/2022 67 (H)  40 - 60 mg/dL Final    LDL Cholesterol  07/08/2022 102 (H)  0 - 100 mg/dL Final    VLDL Cholesterol 07/08/2022 9  5 - 40 mg/dL Final    LDL/HDL Ratio 07/08/2022 1.53   Final    Iron 07/08/2022 26 (L)  37 - 145 mcg/dL Final    Color, UA 07/08/2022 Yellow  Yellow, Straw Final    Appearance, UA 07/08/2022 Cloudy (A)  Clear Final    pH, UA 07/08/2022 6.0  5.0 - 8.0 Final    Specific Gravity, UA 07/08/2022 1.024  1.005 - 1.030 Final    Glucose, UA 07/08/2022 Negative  Negative Final    Ketones, UA 07/08/2022 Negative  Negative Final    Bilirubin, UA 07/08/2022 Negative  Negative Final    Blood, UA 07/08/2022 Negative  Negative Final    Protein, UA 07/08/2022 Negative  Negative Final     Leuk Esterase, UA 07/08/2022 Negative  Negative Final    Nitrite, UA 07/08/2022 Negative  Negative Final    Urobilinogen, UA 07/08/2022 0.2 E.U./dL  0.2 - 1.0 E.U./dL Final    Hepatitis C Ab 07/08/2022 Non-Reactive  Non-Reactive Final       Family History:  Family History   Problem Relation Age of Onset    Hypertension Mother     Hyperlipidemia Mother     Mental illness Brother         BIPOLAR    Heart attack Maternal Uncle     Cancer Maternal Grandmother         STOMACH       Problem List:  Patient Active Problem List   Diagnosis    Depression    Anxiety    Mood disorder    Insomnia         History of Substance Use:   Patient answered yes  to experiencing two or more of the following problems related to substance use: using more than intended or over longer period than intended; difficulty quitting or cutting back use; spending a great deal of time obtaining, using, or recovering from using; craving or strong desire or urge to use;  work and/or school problems; financial problems; family problems; using in dangerous situations; physical or mental health problems; relapse; feelings of guilt or remorse about use; times when used and/or drank alone; needing to use more in order to achieve the desired effect; illness or withdrawal when stopping or cutting back use; using to relieve or avoid getting ill or developing withdrawal symptoms; and black outs and/or memory issues when using.      Patient stated she has been drinking significant amounts of alcohol for the past three years and smoked marijuana. Patient stated her use has decreased significantly over the past few months.     Substance Age Frequency Amount Method Last use   Nicotine        Alcohol        Marijuana        Benzo        Pain Pills        Cocaine        Meth        Heroin        Suboxone        Synthetics/Other:            SUICIDE RISK ASSESSMENT/CSSRS  1. Does patient have thoughts of suicide? no  2. Does patient have intent for suicide? no  3. Does  patient have a current plan for suicide? no  4. History of suicide attempts: no  5. Family history of suicide or attempts: no  6. History of violent behaviors towards others or property or thoughts of committing suicide: yes, patient stated she has become angry and punched a wall out of the anger.   7. History of sexual aggression toward others: no  8. Access to firearms or weapons: no    PHQ-Score Total:  PHQ-9 Total Score: (P) 13    ISIDRO-7 Score Total:  Feeling nervous, anxious or on edge: (P) Nearly every day  Not being able to stop or control worrying: (P) Nearly every day  Worrying too much about different things: (P) More than half the days  Trouble Relaxing: (P) Nearly every day  Being so restless that it is hard to sit still: (P) Not at all  Feeling afraid as if something awful might happen: (P) Not at all  Becoming easily annoyed or irritable: (P) Nearly every day  ISIDRO 7 Total Score: (P) 14  If you checked any problems, how difficult have these problems made it for you to do your work, take care of things at home, or get along with other people: (P) Very difficult      (Scales based on 0 - 10 with 10 being the worst)  Depression: 7/8 Anxiety: 8/9     Mental Status Exam:   Hygiene:   good  Cooperation:  Cooperative  Eye Contact:  Good  Psychomotor Behavior:  Appropriate  Affect:  Full range  Mood: fluctates  Hopelessness: 7  Speech:  Normal  Thought Process:  Goal directed  Thought Content:  Mood congruent  Suicidal:  None  Homicidal:  None  Hallucinations:  None  Delusion:  None  Memory:  Intact  Orientation:  Person, Place, Time, and Situation  Reliability:  good  Insight:  Good  Judgement:  Fair  Impulse Control:  Fair    Impression/Formulation:    Patient appeared alert and oriented.  Patient is voluntarily requesting to begin outpatient therapy at [facility].  Patient is receptive to assistance with maintaining a stable lifestyle.  Patient presents with history of  anxiety and mood instability.  Patient is agreeable to attend routine therapy sessions.  Patient expressed desire to maintain stability and participate in the therapeutic process.        Assessment & Plan   Diagnoses and all orders for this visit:    1. Mood disorder (Primary)    2. Generalized anxiety disorder        Visit Diagnoses:    ICD-10-CM ICD-9-CM   1. Mood disorder  F39 296.90   2. Generalized anxiety disorder  F41.1 300.02        Functional Status: Moderate impairment     Prognosis: Good with Ongoing Treatment     Treatment Plan: Continue supportive psychotherapy efforts and medications as indicated. Obtain release of information for current treatment team for continuity of care as needed. Patient will adhere to medication regimen as prescribed and report any side effects. Patient will contact this office, call 911 or present to the nearest emergency room should suicidal or homicidal ideations occur.    Short Term Goals: Patient will be compliant with medication, and patient will have no significant medication related side effects.  Patient will be engaged in psychotherapy as indicated.  Patient will report subjective improvement of symptoms.    Long Term Goals: To stabilize mood and treat/improve subjective symptoms, the patient will stay out of the hospital, the patient will be at an optimal level of functioning, and the patient will take all medications as prescribed.The patient verbalized understanding and agreement with goals that were mutually set.    Crisis Plan:    If symptoms/behaviors persist, patient will present to the nearest hospital for an assessment. Advised patient of [address] 24/7 assessment services.         This document has been electronically signed by Jayme Hennessy LCSW  September 22, 2023 11:02 EDT    Part of this note may be an electronic transcription/translation of spoken language to printed text using the Dragon Dictation System.

## 2023-10-24 ENCOUNTER — TELEMEDICINE (OUTPATIENT)
Dept: PSYCHIATRY | Facility: CLINIC | Age: 23
End: 2023-10-24
Payer: COMMERCIAL

## 2023-10-24 DIAGNOSIS — F39 MOOD DISORDER: Primary | ICD-10-CM

## 2023-10-24 DIAGNOSIS — F41.1 GENERALIZED ANXIETY DISORDER: ICD-10-CM

## 2023-10-24 PROCEDURE — 90832 PSYTX W PT 30 MINUTES: CPT | Performed by: SOCIAL WORKER

## 2023-10-24 NOTE — TREATMENT PLAN
Multi-Disciplinary Problems (from Behavioral Health Treatment Plan)      Active Problems       Problem: Anxiety  Start Date: 10/24/23      Problem Details: The patient self-scales this problem as a 8 with 10 being the worst.          Goal Priority Start Date Expected End Date End Date    Patient will develop and implement behavioral and cognitive strategies to reduce anxiety and irrational fears. -- 10/24/23 -- --    Goal Details: Progress toward goal:  Not appropriate to rate progress toward goal since this is the initial treatment plan.          Goal Intervention Frequency Start Date End Date    Help patient explore past emotional issues in relation to present anxiety. Q3 Weeks 10/24/23 --    Intervention Details: Duration of treatment until until discharged.          Goal Intervention Frequency Start Date End Date    Help patient develop an awareness of their cognitive and physical responses to anxiety. Q3 Weeks 10/24/23 --    Intervention Details: Duration of treatment until until discharged.                  Problem: Mood Instability  Start Date: 10/24/23      Problem Details: The patient self-scales this problem as a 7 with 10 being the worst.          Goal Priority Start Date Expected End Date End Date    Patient will achieve mood stability as evidenced by controlled behavior and a more deliberate thought process -- 10/24/23 -- --    Goal Details: Progress toward goal:  Not appropriate to rate progress toward goal since this is the initial treatment plan.          Goal Intervention Frequency Start Date End Date    Provide structure and focus to patient's thoughts and actions by establishing plans and routine. Q3 Weeks 10/24/23 --    Intervention Details: Duration of treatment until until discharged.          Goal Intervention Frequency Start Date End Date    Assist patient in setting responsible goals and limits in behavior. Q3 Weeks 10/24/23 --    Intervention Details: Duration of treatment until until  discharged.                          Reviewed By       Jayme Hennessy, [facility] 10/24/23 [id number]                     I have discussed and reviewed this treatment plan with the patient.

## 2023-10-24 NOTE — PROGRESS NOTES
[facility]   Follow-up Progress Note     Date: October 24, 2023  Time In: 2:33  Time Out: 3:04      PROGRESS NOTE  Data:  Saba Brown is a 22 y.o. female presenting to [facility] (through [facility]), [address], [place], [address] using a secure [employer] Video Visit through [facility] for assessment with Jayme Hennessy LCSW. The patient is seen remotely in their  dorm  using [facility] My Chart. Patient is being seen via telehealth and stated they are in a secure environment for this session. The patient's condition being diagnosed/treated is appropriate for telemedicine. The provider identified herself as well as her credentials. The patient and/or patients guardian consent to be seen remotely, and when consent is given they understand that the consent allows for patient identifiable information to be sent to a third party as needed. They may refuse to be seen remotely at any time. The electronic data is encrypted and password protected, and the patient has been advised of the potential risks to privacy not withstanding such measures.    Today Patient stated she is doing well now but a few weeks ago was struggling with increased depression and anxiety. Patient stated she recognizes basketball is a trigger for her anxiety and has increased around the sport since being in college. Patient stated she feels pressure from her mother mostly. Patient expressed she has been lacking motivation to attend basketball responsible. Patient expressed she would like to increase coping strategies to better cope with her emotions in the moment. Patient stated she would also like to work on changing negative thought patterns.       Clinical Maneuvering/Intervention:    Chief Complaint: mood instability, anxiety    (Scales based on 0 - 10 with 10 being the worst)  Depression: 1 Anxiety: 1     Assisted Patient in processing above  session content; acknowledged and normalized patient’s thoughts, feelings, and concerns.  Rationalized patient thought process regarding identifying treatment goals.  Discussed triggers associated with patient's anxiety.  Also discussed coping skills for patient to implement such as prioritizing patient's needs.    Allowed Patient to freely discuss issues  without interruption or judgement with unconditional positive regard, active listening skills, and empathy. Therapist provided a safe, confidential environment to facilitate the development of a positive therapeutic relationship and encouraged open, honest communication. Assisted Patient in identifying risk factors which would indicate the need for higher level of care including thoughts to harm self or others and/or self-harming behavior and encouraged Patient to contact this office, call 911, or present to the nearest emergency room should any of these events occur. Discussed crisis intervention services and means to access. Patient adamantly and convincingly denies current suicidal or homicidal ideation or perceptual disturbance. Assisted Patient in processing session content; acknowledged and normalized Patient’s thoughts, feelings, and concerns by utilizing a person-centered approach in efforts to build appropriate rapport and a positive therapeutic relationship with open and honest communication. Therapist utilized dialectical behavior techniques to teach and model emotional regulation and relaxation methods. Therapist assisted Patient with identifying and implementing healthier coping strategies.     Assessment   Patient appears to be experiencing heightened anxiety and depression in response to COVID-19 epidemic  As a result, they can be reasonably expected to continue to benefit from treatment and would likely be at increased risk for decompensation otherwise.    Mental Status Exam:   Hygiene:   good  Cooperation:  Cooperative  Eye Contact:   Good  Psychomotor Behavior:  Appropriate  Affect:  Full range  Mood:  calm  Speech:  Normal  Thought Process:  Goal directed  Thought Content:  Mood congruent  Suicidal:  None  Homicidal:  None  Hallucinations:  None  Delusion:  None  Memory:  Intact  Orientation:  Person, Place, Time, and Situation  Reliability:  good  Insight:  Good  Judgement:  Good  Impulse Control:  Fair  Physical/Medical Issues:  No      PHQ-Score Total:  PHQ-9 Total Score:        Patient's Support Network Includes:  parents and roommate    Functional Status: Moderate impairment     Progress toward goal: Not at goal    Prognosis: Good with Ongoing Treatment            Impression/Formulation:    VISIT DIAGNOSIS:     ICD-10-CM ICD-9-CM   1. Mood disorder  F39 296.90   2. Generalized anxiety disorder  F41.1 300.02        Patient appeared alert and oriented.  Patient is voluntarily requesting to continue outpatient therapy at [facility].  Patient is receptive to assistance with maintaining a stable lifestyle.  Patient presents with history of mood instability and anxiety.  Patient is agreeable to attend routine therapy sessions.  Patient expressed desire to maintain stability and participate in the therapeutic process.        Crisis Plan:  Symptoms and/or behaviors to indicate a crisis: Prolonged irritability or anger and Thinking about suicide    What calming techniques or other strategies will patient use to de-esclate and stay safe: slow down, breathe, visualize calming self, think it though, listen to music, change focus, take a walk    Who is one person patient can contact to assist with de-escalation? Teammates, , parents/family    If symptoms/behaviors persist, Patient will present to the nearest hospital for an assessment. Advised patient of [facility] ER and assessment services.     Plan:   Patient will continue in individual outpatient therapy with focus on improved functioning and coping  skills, maintaining stability, and avoiding decompensation and the need for higher level of care.    Patient will contact this office ([facility] at [phone]), call 911 or present to the nearest emergency room should suicidal or homicidal ideations occur. Provide Cognitive Behavioral Therapy and Solution Focused Therapy to improve functioning, maintain stability, and avoid decompensation and the need for higher level of care.     Return in about 3 weeks, or earlier if symptoms worsen or fail to improve.    Recommended Referrals: none        This document has been electronically signed by Jayme Hennessy LCSW  October 24, 2023 14:33 EDT        Part of this note may be an electronic transcription/translation of spoken language to printed text using the Dragon Dictation System.

## 2023-10-24 NOTE — PLAN OF CARE
Problem: Anxiety  Goal: Patient will develop and implement behavioral and cognitive strategies to reduce anxiety and irrational fears.  Outcome: Ongoing, Not Progressing     Problem: Mood Instability  Goal: Patient will achieve mood stability as evidenced by controlled behavior and a more deliberate thought process  Outcome: Ongoing, Not Progressing

## 2023-11-28 ENCOUNTER — TELEMEDICINE (OUTPATIENT)
Dept: PSYCHIATRY | Facility: CLINIC | Age: 23
End: 2023-11-28
Payer: COMMERCIAL

## 2023-11-28 DIAGNOSIS — F41.1 GENERALIZED ANXIETY DISORDER: ICD-10-CM

## 2023-11-28 DIAGNOSIS — F39 MOOD DISORDER: Primary | ICD-10-CM

## 2023-11-28 NOTE — PROGRESS NOTES
[facility]   Follow-up Progress Note     Date: November 28, 2023  Time In: 2:35  Time Out: 3:27      PROGRESS NOTE  Data:  Saba Brown is a 23 y.o. female presenting to [facility] (through [facility]), [address], [facility], [address] using a secure [employer] Video Visit through [facility] for assessment with Jayme Hennessy LCSW. The patient is seen remotely in their  school  using [facility] My Chart. Patient is being seen via telehealth and stated they are in a secure environment for this session. The patient's condition being diagnosed/treated is appropriate for telemedicine. The provider identified herself as well as her credentials. The patient and/or patients guardian consent to be seen remotely, and when consent is given they understand that the consent allows for patient identifiable information to be sent to a third party as needed. They may refuse to be seen remotely at any time. The electronic data is encrypted and password protected, and the patient has been advised of the potential risks to privacy not withstanding such measures.    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 Patient stated she also celebrated her birthday since the previous appointment. Patient expressed she feels she has been doing much better since the previous appointment. Patient processed how her commitment to her team impacts her emotions. Assisted patient in identifying the emotions she is experiencing and the emotions that are safe to show others. Patient stated she feels humiliated, out of control of her life decisions, sad, insecure, and weak but outwardly shows as anger.       Clinical Maneuvering/Intervention:    Chief Complaint: mood instability, anxiety    (Scales based on 0 - 10 with 10 being the worst)  Depression: 3 Anxiety: 7     Assisted Patient in processing above  session content; acknowledged and normalized patient’s thoughts, feelings, and concerns.  Rationalized patient thought process regarding bottling up her emotions.  Discussed triggers associated with patient's anxiety.  Also discussed coping skills for patient to implement such as positively identifying and expressing her emotions.    Allowed Patient to freely discuss issues  without interruption or judgement with unconditional positive regard, active listening skills, and empathy. Therapist provided a safe, confidential environment to facilitate the development of a positive therapeutic relationship and encouraged open, honest communication. Assisted Patient in identifying risk factors which would indicate the need for higher level of care including thoughts to harm self or others and/or self-harming behavior and encouraged Patient to contact this office, call 911, or present to the nearest emergency room should any of these events occur. Discussed crisis intervention services and means to access. Patient adamantly and convincingly denies current suicidal or homicidal ideation or perceptual disturbance. Assisted Patient in processing session content; acknowledged and normalized Patient’s thoughts, feelings, and concerns by utilizing a person-centered approach in efforts to build appropriate rapport and a positive therapeutic relationship with open and honest communication. Therapist utilized dialectical behavior techniques to teach and model emotional regulation and relaxation methods. Therapist assisted Patient with identifying and implementing healthier coping strategies.     Assessment   Patient appears to be experiencing heightened anxiety and depression in response to COVID-19 epidemic  As a result, they can be reasonably expected to continue to benefit from treatment and would likely be at increased risk for decompensation otherwise.    Mental Status Exam:   Hygiene:   good  Cooperation:  Cooperative  Eye  Contact:  Good  Psychomotor Behavior:  Appropriate  Affect:  Full range  Mood:  on edge  Speech:  Normal  Thought Process:  Goal directed  Thought Content:  Mood congruent  Suicidal:  None  Homicidal:  None  Hallucinations:  None  Delusion:  None  Memory:  Intact  Orientation:  Person, Place, Time, and Situation  Reliability:  good  Insight:  Good  Judgement:  Good  Impulse Control:  Fair  Physical/Medical Issues:  No      PHQ-Score Total:  PHQ-9 Total Score:        Patient's Support Network Includes:  parents and roommate    Functional Status: Moderate impairment     Progress toward goal: Not at goal    Prognosis: Good with Ongoing Treatment            Impression/Formulation:    VISIT DIAGNOSIS:     ICD-10-CM ICD-9-CM   1. Mood disorder  F39 296.90   2. Generalized anxiety disorder  F41.1 300.02        Patient appeared alert and oriented.  Patient is voluntarily requesting to continue outpatient therapy at [facility].  Patient is receptive to assistance with maintaining a stable lifestyle.  Patient presents with history of mood instability and anxiety.  Patient is agreeable to attend routine therapy sessions.  Patient expressed desire to maintain stability and participate in the therapeutic process.        Crisis Plan:  Symptoms and/or behaviors to indicate a crisis: Prolonged irritability or anger and Thinking about suicide    What calming techniques or other strategies will patient use to de-esclate and stay safe: slow down, breathe, visualize calming self, think it though, listen to music, change focus, take a walk    Who is one person patient can contact to assist with de-escalation? Team mates, , parents/family    If symptoms/behaviors persist, Patient will present to the nearest hospital for an assessment. Advised patient of [facility] ER and assessment services.     Plan:   Patient will continue in individual outpatient therapy with focus on improved functioning  and coping skills, maintaining stability, and avoiding decompensation and the need for higher level of care.    Patient will contact this office ([facility] at [phone]), call 911 or present to the nearest emergency room should suicidal or homicidal ideations occur. Provide Cognitive Behavioral Therapy and Solution Focused Therapy to improve functioning, maintain stability, and avoid decompensation and the need for higher level of care.     Return in about 3 weeks, or earlier if symptoms worsen or fail to improve.    Recommended Referrals: none        This document has been electronically signed by Jayme Hennessy LCSW  November 28, 2023 14:34 EST        Part of this note may be an electronic transcription/translation of spoken language to printed text using the Dragon Dictation System.

## 2023-12-13 ENCOUNTER — TELEMEDICINE (OUTPATIENT)
Dept: PSYCHIATRY | Facility: CLINIC | Age: 23
End: 2023-12-13
Payer: COMMERCIAL

## 2023-12-13 DIAGNOSIS — F31.32 BIPOLAR AFFECTIVE DISORDER, CURRENTLY DEPRESSED, MODERATE: Primary | ICD-10-CM

## 2023-12-13 DIAGNOSIS — Z76.0 MEDICATION REFILL: ICD-10-CM

## 2023-12-13 DIAGNOSIS — F34.1 DYSTHYMIA: Chronic | ICD-10-CM

## 2023-12-13 DIAGNOSIS — F41.0 PANIC ANXIETY SYNDROME: ICD-10-CM

## 2023-12-13 RX ORDER — CITALOPRAM 20 MG/1
20 TABLET ORAL DAILY
Qty: 30 TABLET | Refills: 0 | Status: SHIPPED | OUTPATIENT
Start: 2023-12-13

## 2023-12-13 RX ORDER — TRAZODONE HYDROCHLORIDE 50 MG/1
25-50 TABLET ORAL NIGHTLY PRN
Qty: 30 TABLET | Refills: 0 | Status: SHIPPED | OUTPATIENT
Start: 2023-12-13

## 2023-12-13 RX ORDER — LAMOTRIGINE 25 MG/1
TABLET ORAL
Qty: 42 TABLET | Refills: 0 | Status: SHIPPED | OUTPATIENT
Start: 2023-12-13

## 2023-12-13 NOTE — PROGRESS NOTES
This provider is located at the [facility] (through [facility]), [address], [facility], [address] using a secure [employer] Video Visit through [employer]. Patient is being seen remotely via telehealth at their home address in [place], and stated they are in a secure environment for this session. The patient's condition being diagnosed/treated is appropriate for telemedicine. The provider identified herself as well as her credentials.   The patient, and/or patients guardian, consent to be seen remotely, and when consent is given they understand that the consent allows for patient identifiable information to be sent to a third party as needed.   They may refuse to be seen remotely at any time. The electronic data is encrypted and password protected, and the patient and/or guardian has been advised of the potential risks to privacy not withstanding such measures.    You have chosen to receive care through a telehealth visit.  Do you consent to use a video/audio connection for your medical care today? Yes    Patient identifiers utilized: Name and date of birth.    Patient verbally confirmed consent for today's encounter:  December 13, 2023    Levi Brown is a 23 y.o. female who presents today for initial evaluation     Chief Complaint:    Chief Complaint   Patient presents with    Anxiety    Depression    Med Management    Sleeping Problem    Irritable    Panic Attack        Referring Provider: Jayme Hennessy LCSW    History of Present Illness:    History of Present Illness  Patient is a 23-year-old female presenting for initial psychiatric consultation, referred for a mood disorder and anxiety.  Patient's mental health history began her sophomore year of high school when she saw a therapist for anxiety.  She remains in counseling currently.  States she attends approximately once per month.  Previously prescribed Seroquel and Atarax, voices side effects with  "use and thus these medications are removed at this time.  Celexa she remains upon daily, believes \"it has helped with anxiety.\"  She denies previous psychiatric hospitalizations or suicide attempts.  She indicates irritability is one of her largest concerns today, \"punch walls, just when I am angry.\"  She states this occurs \"once or twice a month.\"  PHQ score was 13 currently indicates moderate depression which patient rates in 8/10.  She states there are times when depression hits \"I am not social and want to lay in bed all day.\"  ISIDRO score 15 indicates severe anxiety which patient rates a 9/10.  She cites panic attacks that occur approximately 3 times per month, mostly related to relationships \"sighing and breakdown\" when receiving feedback from her .  Currently has difficulty with sleep, citing approximately 4 hours nightly.  Also has participated in over eating frequently.  Acknowledges impulsivity \"when it comes to money I have to spend it.  I make a lot of risky and impulsive decisions\" and discusses marijuana use as impulsive at times.  Recently she felt \"so angry I thought I would block out.\"  She states.  Of extreme anxiety \"so much anxiety I cannot speak and I am crying.\"  She denies SI, HI, SIB, or hallucinations currently and is convincing. ASRS performed and positive with a score of 12 for mood condition.    Patient resides in her college dorm on campus at [employer].  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  She plays basketball.  Cites her parents remain together, relationship with them is improving.  Good relationships with her brother and her sister.  She is single, no children.  Does have a significant other.  Denies previous arrests.  [employer] preference.  Enjoys playing basketball and playing Xbox.  States frequent marijuana use, multiple times daily.       Last Menstrual Period:  Nov 22    The patient denies any chance of pregnancy at this " time.  The patient was educated that her prescribed medications can have potential risk to a developing fetus. The patient is advised to contact this APRN/this office if she becomes pregnant or plans to become pregnant.  Pt verbalizes understanding and acknowledged agreement with this plan in her own words.      The following portions of the patient's history were reviewed and updated as appropriate: allergies, current medications, past family history, past medical history, past social history, past surgical history and problem list.    Past Psychiatric History:  Began Treatment: sophomore year  Diagnoses:Anxiety  Psychiatrist:Chon  Therapist: once a month  Admission History:Denies  Medication Trials: zoloft (I didn't eat on those and gained weight), seroquel (groggy)  Self Harm: Denies  Suicide Attempts:Denies   Psychosis, Anxiety, Depression: Denies    Past Medical History:  Past Medical History:   Diagnosis Date    Anxiety     Depression     Insomnia        Substance Abuse History:   Types:Denies all, including illicit  Withdrawal Symptoms:Denies  Longest Period Sober:Not Applicable   AA: Not applicable     Social History:  Social History     Socioeconomic History    Marital status: Single    Number of children: 0   Tobacco Use    Smoking status: Never    Smokeless tobacco: Never   Vaping Use    Vaping Use: Every day    Substances: Nicotine    Devices: Disposable   Substance and Sexual Activity    Alcohol use: Yes     Alcohol/week: 2.0 standard drinks of alcohol     Types: 2 Shots of liquor per week     Comment: SOC    Drug use: Never    Sexual activity: Yes     Partners: Female     Birth control/protection: Same-sex partner       Family History:  Family History   Problem Relation Age of Onset    Hypertension Mother     Hyperlipidemia Mother     Mental illness Brother         BIPOLAR    Bipolar disorder Maternal Aunt     Heart attack Maternal Uncle     Cancer Maternal Grandmother         STOMACH        Past Surgical History:  Past Surgical History:   Procedure Laterality Date    MOUTH SURGERY      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 30 tablet 0    lamoTRIgine (LaMICtal) 25 MG tablet Take 1 tab by mouth daily x 2 weeks, if well tolerated increase to 1 tab by mouth twice daily. 42 tablet 0    traZODone (DESYREL) 50 MG tablet Take 0.5-1 tablets by mouth At Night As Needed for Sleep. 30 tablet 0     No current facility-administered medications for this visit.       Review of Systems:    Review of Systems   Constitutional:  Positive for appetite change.   HENT:  Positive for congestion.    Eyes: Negative.    Respiratory: Negative.     Cardiovascular: Negative.    Gastrointestinal: Negative.    Endocrine: Negative.    Genitourinary: Negative.    Musculoskeletal:  Positive for back pain.   Skin: Negative.    Allergic/Immunologic: Negative.    Neurological: Negative.  Negative for seizures.   Hematological: Negative.    Psychiatric/Behavioral:  Positive for agitation, dysphoric mood, sleep disturbance, depressed mood and stress. The patient is nervous/anxious.          Physical Exam:   Physical Exam  Constitutional:       Appearance: Normal appearance.   HENT:      Head: Normocephalic.      Nose: Congestion present.   Pulmonary:      Effort: Pulmonary effort is normal.   Musculoskeletal:         General: Normal range of motion.      Cervical back: Normal range of motion.   Neurological:      Mental Status: She is alert.   Psychiatric:         Attention and Perception: Attention and perception normal.         Mood and Affect: Mood is anxious and depressed.         Speech: Speech normal.          Behavior: Behavior normal. Behavior is cooperative.         Thought Content: Thought content normal.         Cognition and Memory: Cognition and memory normal.         Judgment: Judgment is impulsive.         Vitals:  not currently breastfeeding. There is no height or weight on file to calculate BMI.  Due to extenuating circumstances and possible current health risks associated with the patient being present in a clinical setting (with current health restrictions in place in regards to possible COVID 19 transmission/exposure), the patient was seen remotely today via a MyChart Video Visit through [facility].  Unable to obtain vital signs due to nature of remote visit.  Height stated at 5ft4.5 inches.  Weight stated at 160 pounds.    Last 3 Blood Pressure Readings:  BP Readings from Last 3 Encounters:   07/28/23 117/70   10/25/22 122/86   08/19/22 102/68       PHQ-9 Score:   PHQ-9 Total Score:  PHQ-9 Depression Screening  Little interest or pleasure in doing things? (P) 2-->more than half the days   Feeling down, depressed, or hopeless? (P) 2-->more than half the days   Trouble falling or staying asleep, or sleeping too much? (P) 3-->nearly every day   Feeling tired or having little energy? (P) 2-->more than half the days   Poor appetite or overeating? (P) 2-->more than half the days   Feeling bad about yourself - or that you are a failure or have let yourself or your family down? (P) 1-->several days   Trouble concentrating on things, such as reading the newspaper or watching television? (P) 1-->several days   Moving or speaking so slowly that other people could have noticed? Or the opposite - being so fidgety or restless that you have been moving around a lot more than usual? (P) 0-->not at all   Thoughts that you would be better off dead, or of hurting yourself in some way? (P) 0-->not at all   PHQ-9 Total Score (P) 13   If you checked off any problems, how difficult have these problems made it for you to do your work,  take care of things at home, or get along with other people? (P) somewhat difficult         ISIDRO-7 Score:   Feeling nervous, anxious or on edge: (P) More than half the days  Not being able to stop or control worrying: (P) Nearly every day  Worrying too much about different things: (P) More than half the days  Trouble Relaxing: (P) More than half the days  Being so restless that it is hard to sit still: (P) More than half the days  Feeling afraid as if something awful might happen: (P) More than half the days  Becoming easily annoyed or irritable: (P) More than half the days  ISIDRO 7 Total Score: (P) 15  If you checked any problems, how difficult have these problems made it for you to do your work, take care of things at home, or get along with other people: (P) Very difficult     Mental Status Exam:   Hygiene:   good  Cooperation:  Cooperative  Eye Contact:  Good  Psychomotor Behavior:  Appropriate  Affect:  Full range  Mood: sad  Hopelessness: Denies  Speech:  Normal  Thought Process:  Goal directed and Linear  Thought Content:  Normal  Suicidal:  None  Homicidal:  None  Hallucinations:  None  Delusion:  None  Memory:  Intact  Orientation:  Grossly intact  Reliability:  good  Insight:  Fair  Judgement:  Impaired  Impulse Control:  Impaired  Physical/Medical Issues:  No        Lab Results:   No visits with results within 6 Month(s) from this visit.   Latest known visit with results is:   Office Visit on 07/08/2022   Component Date Value Ref Range Status    Glucose 07/08/2022 76  65 - 99 mg/dL Final    BUN 07/08/2022 8  6 - 20 mg/dL Final    Creatinine 07/08/2022 0.71  0.57 - 1.00 mg/dL Final    Sodium 07/08/2022 137  136 - 145 mmol/L Final    Potassium 07/08/2022 4.1  3.5 - 5.2 mmol/L Final    Chloride 07/08/2022 103  98 - 107 mmol/L Final    CO2 07/08/2022 24.6  22.0 - 29.0 mmol/L Final    Calcium 07/08/2022 9.6  8.6 - 10.5 mg/dL Final    Total Protein 07/08/2022 7.6  6.0 - 8.5 g/dL Final    Albumin 07/08/2022 4.80   3.50 - 5.20 g/dL Final    ALT (SGPT) 07/08/2022 24  1 - 33 U/L Final    AST (SGOT) 07/08/2022 45 (H)  1 - 32 U/L Final    Alkaline Phosphatase 07/08/2022 52  39 - 117 U/L Final    Total Bilirubin 07/08/2022 0.3  0.0 - 1.2 mg/dL Final    Globulin 07/08/2022 2.8  gm/dL Final    A/G Ratio 07/08/2022 1.7  g/dL Final    BUN/Creatinine Ratio 07/08/2022 11.3  7.0 - 25.0 Final    Anion Gap 07/08/2022 9.4  5.0 - 15.0 mmol/L Final    eGFR 07/08/2022 125.0  >60.0 mL/min/1.73 Final    National Kidney Foundation and American Society of Nephrology (ASN) Task Force recommended calculation based on the Chronic Kidney Disease Epidemiology Collaboration (CKD-EPI) equation refit without adjustment for race.    WBC 07/08/2022 5.08  3.40 - 10.80 10*3/mm3 Final    RBC 07/08/2022 4.55  3.77 - 5.28 10*6/mm3 Final    Hemoglobin 07/08/2022 12.8  12.0 - 15.9 g/dL Final    Hematocrit 07/08/2022 38.4  34.0 - 46.6 % Final    MCV 07/08/2022 84.4  79.0 - 97.0 fL Final    MCH 07/08/2022 28.1  26.6 - 33.0 pg Final    MCHC 07/08/2022 33.3  31.5 - 35.7 g/dL Final    RDW 07/08/2022 15.1  12.3 - 15.4 % Final    RDW-SD 07/08/2022 45.3  37.0 - 54.0 fl Final    MPV 07/08/2022 9.6  6.0 - 12.0 fL Final    Platelets 07/08/2022 291  140 - 450 10*3/mm3 Final    Neutrophil % 07/08/2022 48.6  42.7 - 76.0 % Final    Lymphocyte % 07/08/2022 39.2  19.6 - 45.3 % Final    Monocyte % 07/08/2022 9.6  5.0 - 12.0 % Final    Eosinophil % 07/08/2022 1.6  0.3 - 6.2 % Final    Basophil % 07/08/2022 0.8  0.0 - 1.5 % Final    Immature Grans % 07/08/2022 0.2  0.0 - 0.5 % Final    Neutrophils, Absolute 07/08/2022 2.47  1.70 - 7.00 10*3/mm3 Final    Lymphocytes, Absolute 07/08/2022 1.99  0.70 - 3.10 10*3/mm3 Final    Monocytes, Absolute 07/08/2022 0.49  0.10 - 0.90 10*3/mm3 Final    Eosinophils, Absolute 07/08/2022 0.08  0.00 - 0.40 10*3/mm3 Final    Basophils, Absolute 07/08/2022 0.04  0.00 - 0.20 10*3/mm3 Final    Immature Grans, Absolute 07/08/2022 0.01  0.00 - 0.05 10*3/mm3  Final    nRBC 07/08/2022 0.0  0.0 - 0.2 /100 WBC Final    TSH 07/08/2022 1.940  0.270 - 4.200 uIU/mL Final    Total Cholesterol 07/08/2022 178  0 - 200 mg/dL Final    Triglycerides 07/08/2022 43  0 - 150 mg/dL Final    HDL Cholesterol 07/08/2022 67 (H)  40 - 60 mg/dL Final    LDL Cholesterol  07/08/2022 102 (H)  0 - 100 mg/dL Final    VLDL Cholesterol 07/08/2022 9  5 - 40 mg/dL Final    LDL/HDL Ratio 07/08/2022 1.53   Final    Iron 07/08/2022 26 (L)  37 - 145 mcg/dL Final    Color, UA 07/08/2022 Yellow  Yellow, Straw Final    Appearance, UA 07/08/2022 Cloudy (A)  Clear Final    pH, UA 07/08/2022 6.0  5.0 - 8.0 Final    Specific Gravity, UA 07/08/2022 1.024  1.005 - 1.030 Final    Glucose, UA 07/08/2022 Negative  Negative Final    Ketones, UA 07/08/2022 Negative  Negative Final    Bilirubin, UA 07/08/2022 Negative  Negative Final    Blood, UA 07/08/2022 Negative  Negative Final    Protein, UA 07/08/2022 Negative  Negative Final    Leuk Esterase, UA 07/08/2022 Negative  Negative Final    Nitrite, UA 07/08/2022 Negative  Negative Final    Urobilinogen, UA 07/08/2022 0.2 E.U./dL  0.2 - 1.0 E.U./dL Final    Hepatitis C Ab 07/08/2022 Non-Reactive  Non-Reactive Final         Assessment & Plan   Problems Addressed this Visit          Mental Health    Anxiety (Chronic)    Relevant Medications    citalopram (CeleXA) 20 MG tablet     Other Visit Diagnoses       Bipolar affective disorder, currently depressed, moderate    -  Primary    Relevant Medications    lamoTRIgine (LaMICtal) 25 MG tablet    citalopram (CeleXA) 20 MG tablet    traZODone (DESYREL) 50 MG tablet    Dysthymia  (Chronic)       Relevant Medications    lamoTRIgine (LaMICtal) 25 MG tablet    citalopram (CeleXA) 20 MG tablet    traZODone (DESYREL) 50 MG tablet    Medication refill        Relevant Medications    lamoTRIgine (LaMICtal) 25 MG tablet    citalopram (CeleXA) 20 MG tablet          Diagnoses         Codes Comments    Bipolar affective disorder, currently  depressed, moderate    -  Primary ICD-10-CM: F31.32  ICD-9-CM: 296.52     Panic anxiety syndrome     ICD-10-CM: F41.0  ICD-9-CM: 300.01     Dysthymia     ICD-10-CM: F34.1  ICD-9-CM: 300.4     Medication refill     ICD-10-CM: Z76.0  ICD-9-CM: V68.1             Visit Diagnoses:    ICD-10-CM ICD-9-CM   1. Bipolar affective disorder, currently depressed, moderate  F31.32 296.52   2. Panic anxiety syndrome  F41.0 300.01   3. Dysthymia  F34.1 300.4   4. Medication refill  Z76.0 V68.1     Celexa refilled.  Seroquel and Atarax removed, patient experienced side effects and is no longer taking.  Lamictal 25 mg x 2 weeks then increase to 50 mg daily initiated.  Trazodone 25 to 50 mg as needed nightly for sleep also initiated. Patient is educated upon risks versus benefits as well as side effects and when to seek care in an emergency setting.  Patient verbalized understanding.  Continue counseling.  Follow up with this provider in 4 weeks or sooner if needed.    Discussed need marijuana use with current medications could exacerbate mental health symptoms, leading to further depression or psychosis. There is also some research to support psychotropics and mood stabilizers with marijuana use could reduce efficacy of medications. may increase side effects such as dizziness, drowsiness, confusion, and difficulty concentrating. Some people, especially the elderly, may also experience impairment in thinking, judgment, and motor coordination. Discussed with patient need to practice cessation, verbalized understanding.    GOALS:  Short Term Goals: Patient will be compliant with medication, and patient will have no significant medication related side effects.  Patient will be engaged in psychotherapy as indicated.  Patient will report subjective improvement of symptoms.  Long term goals: To stabilize mood and treat/improve subjective symptoms, the patient will stay out of the hospital, the patient will be at an optimal level of  functioning, and the patient will take all medications as prescribed.  The patient/guardian verbalized understanding and agreement with goals that were mutually set.      TREATMENT PLAN: Continue supportive psychotherapy efforts and medications as indicated.  Pharmacological and Non-Pharmacological treatment options discussed during today's visit. Patient/Guardian acknowledged and verbally consented with current treatment plan and was educated on the importance of compliance with treatment and follow-up appointments.      MEDICATION ISSUES:  Discussed medication options and treatment plan of prescribed medication as well as the risks, benefits, any black box warnings, and side effects including potential falls, possible impaired driving, and metabolic adversities among others. Patient is agreeable to call the office with any worsening of symptoms or onset of side effects, or if any concerns or questions arise.  The contact information for the office is made available to the patient. Patient is agreeable to call 911 or go to the nearest ER should they begin having any SI/HI, or if any urgent concerns arise. No medication side effects or related complaints today.     MEDS ORDERED DURING VISIT:  New Medications Ordered This Visit   Medications    lamoTRIgine (LaMICtal) 25 MG tablet     Sig: Take 1 tab by mouth daily x 2 weeks, if well tolerated increase to 1 tab by mouth twice daily.     Dispense:  42 tablet     Refill:  0    citalopram (CeleXA) 20 MG tablet     Sig: Take 1 tablet by mouth Daily.     Dispense:  30 tablet     Refill:  0    traZODone (DESYREL) 50 MG tablet     Sig: Take 0.5-1 tablets by mouth At Night As Needed for Sleep.     Dispense:  30 tablet     Refill:  0       Follow Up Appointment:   Return in about 4 weeks (around 1/10/2024) for Recheck.             This document has been electronically signed by RIVER Casper  December 13, 2023 09:53 EST    Dictated Utilizing Dragon Dictation: Part of  this note may be an electronic transcription/translation of spoken language to printed text using the Dragon Dictation System.

## 2023-12-13 NOTE — TREATMENT PLAN
Multi-Disciplinary Problems (from Behavioral Health Treatment Plan)      Active Problems       Problem: Anxiety  Start Date: 12/13/23      Problem Details: The patient self-scales this problem as a 9 with 10 being the worst.          Goal Priority Start Date Expected End Date End Date    Patient will develop and implement behavioral and cognitive strategies to reduce anxiety and irrational fears. -- 12/13/23 -- --    Goal Details: Progress toward goal:  Not appropriate to rate progress toward goal since this is the initial treatment plan.          Goal Intervention Frequency Start Date End Date    Help patient explore past emotional issues in relation to present anxiety. PRN 12/13/23 --    Intervention Details: Duration of treatment until until discharged.          Goal Intervention Frequency Start Date End Date    Help patient develop an awareness of their cognitive and physical responses to anxiety. PRN 12/13/23 --    Intervention Details: Duration of treatment until until discharged.                  Problem: Depression  Start Date: 12/13/23      Problem Details: The patient self-scales this problem as a 8 with 10 being the worst.          Goal Priority Start Date Expected End Date End Date    Patient will demonstrate the ability to initiate new constructive life skills outside of sessions on a consistent basis. -- 12/13/23 -- --    Goal Details: Progress toward goal:  Not appropriate to rate progress toward goal since this is the initial treatment plan.          Goal Intervention Frequency Start Date End Date    Assist patient in setting attainable activities of daily living goals. PRN 12/13/23 --      Goal Intervention Frequency Start Date End Date    Provide education about depression PRN 12/13/23 --    Intervention Details: Duration of treatment until until discharged.          Goal Intervention Frequency Start Date End Date    Assist patient in developing healthy coping strategies. PRN 12/13/23 --     Intervention Details: Duration of treatment until until discharged.                  Problem: Mood Instability  Start Date: 12/13/23      Problem Details: The patient self-scales this problem as a 10 with 10 being the worst.          Goal Priority Start Date Expected End Date End Date    Patient will achieve mood stability as evidenced by controlled behavior and a more deliberate thought process -- 12/13/23 -- --    Goal Details: Progress toward goal:  Not appropriate to rate progress toward goal since this is the initial treatment plan.          Goal Intervention Frequency Start Date End Date    Provide structure and focus to patient's thoughts and actions by establishing plans and routine. PRN 12/13/23 --    Intervention Details: Duration of treatment until until discharged.          Goal Intervention Frequency Start Date End Date    Assist patient in setting responsible goals and limits in behavior. PRN 12/13/23 --    Intervention Details: Duration of treatment until until discharged.                                 I have discussed and reviewed this treatment plan with the patient and/or guardian.  The patient has verbally agreed with this treatment plan (no signatures are obtained at today's visit as the patient is a telehealth patient and is unable to print and sign this document, therefore verbal agreement is obtained).

## 2024-01-10 ENCOUNTER — TELEMEDICINE (OUTPATIENT)
Dept: PSYCHIATRY | Facility: CLINIC | Age: 24
End: 2024-01-10
Payer: COMMERCIAL

## 2024-01-10 DIAGNOSIS — F34.1 DYSTHYMIA: Chronic | ICD-10-CM

## 2024-01-10 DIAGNOSIS — Z76.0 MEDICATION REFILL: ICD-10-CM

## 2024-01-10 DIAGNOSIS — F41.0 PANIC ANXIETY SYNDROME: ICD-10-CM

## 2024-01-10 DIAGNOSIS — F31.32 BIPOLAR AFFECTIVE DISORDER, CURRENTLY DEPRESSED, MODERATE: ICD-10-CM

## 2024-01-10 RX ORDER — CITALOPRAM 20 MG/1
20 TABLET ORAL DAILY
Qty: 30 TABLET | Refills: 0 | Status: SHIPPED | OUTPATIENT
Start: 2024-01-10

## 2024-01-10 RX ORDER — LAMOTRIGINE 25 MG/1
TABLET ORAL
Qty: 60 TABLET | Refills: 0 | Status: SHIPPED | OUTPATIENT
Start: 2024-01-10

## 2024-01-10 NOTE — PROGRESS NOTES
"  This provider is located at the [facility] ([facility]), [address], [place], [address] using a secure MyChart Video Visit through [employer]. Patient is being seen remotely via telehealth at their home address in [place], and stated they are in a secure environment for this session. The patient's condition being diagnosed/treated is appropriate for telemedicine. The provider identified herself as well as her credentials.   The patient, and/or patients guardian, consent to be seen remotely, and when consent is given they understand that the consent allows for patient identifiable information to be sent to a third party as needed.   They may refuse to be seen remotely at any time. The electronic data is encrypted and password protected, and the patient and/or guardian has been advised of the potential risks to privacy not withstanding such measures.    You have chosen to receive care through a telehealth visit.  Do you consent to use a video/audio connection for your medical care today? Yes    Patient identifiers utilized: Name and date of birth.    Patient verbally confirmed consent for today's encounter:  January 10, 2024    Levi Brown is a 23 y.o. female who presents today for follow up    Chief Complaint:    Chief Complaint   Patient presents with    Anxiety    Depression    Med Management    Irritable    Sleeping Problem        Referring Provider: Jayme Hennessy LCSW    History of Present Illness:    History of Present Illness  Patient is a 23-year-old female presenting for a 1 month follow-up related to dysthymia, irritability, depression and anxiety as well as sleep disturbances.  Patient initially experienced heightened anxiety when starting Lamictal, this has since subsided.  She has successfully titrated up to 25 mg twice daily.  She has noticed some efficacy with use \"been feeling pretty good.  I still have some irritability but it " "has not really anything.\"  ISIDRO reduced from 15-4, minimal for anxiety which patient currently rates a 5/10.  She states \"been a lot more chill, this is the best I have felt in a long time.\"  PHQ reduced as well from 13-4, minimal for depression which patient currently rates a 0/10.  She denies recent tearful episodes.  States her anxiety is \"not bad.\"  Appetite is \"pretty good, I eat once a day.\"  Sleep is also \"pretty good.\"  Regarding irritability and agitation \"I have not been angry.\"  Regarding dysthymia \"I have a lot more motivation.\"  Has utilized trazodone approximately twice \"when I do take it, I do sleep.\"  She denies SI, HI, SIB, or hallucinations currently and is convincing.  Denies medical status changes since previous session. Denies panic episodes.     Patient resides in her college dorm on campus at [place].  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  She is on break from school, this will resume in 2 weeks. She plays basketball.  Cites her parents remain together, relationship with them is improving.  Good relationships with her brother and her sister.  She is single, no children.  Does have a significant other.  Denies previous arrests.  [employer] preference.  Enjoys playing basketball and playing Xbox.  States frequent marijuana use, multiple times daily. Not currently in counseling.       Last Menstrual Period:  Last month    The patient denies any chance of pregnancy at this time.  The patient was educated that her prescribed medications can have potential risk to a developing fetus. The patient is advised to contact this APRN/this office if she becomes pregnant or plans to become pregnant.  Pt verbalizes understanding and acknowledged agreement with this plan in her own words.      The following portions of the patient's history were reviewed and updated as appropriate: allergies, current medications, past family history, past medical history, past " social history, past surgical history and problem list.    Past Psychiatric History:  Began Treatment: sophomore year  Diagnoses:Anxiety  Psychiatrist:[facility]on  Therapist: once a month  Admission History:Denies  Medication Trials: zoloft (I didn't eat on those and gained weight), seroquel (groggy)  Self Harm: Denies  Suicide Attempts:Denies   Psychosis, Anxiety, Depression: Denies    Past Medical History:  Past Medical History:   Diagnosis Date    Anxiety     Depression     Insomnia        Substance Abuse History:   Types:Denies all, including illicit  Withdrawal Symptoms:Denies  Longest Period Sober:Not Applicable   AA: Not applicable     Social History:  Social History     Socioeconomic History    Marital status: Single    Number of children: 0   Tobacco Use    Smoking status: Never    Smokeless tobacco: Never   Vaping Use    Vaping Use: Every day    Substances: Nicotine    Devices: Disposable   Substance and Sexual Activity    Alcohol use: Yes     Alcohol/week: 2.0 standard drinks of alcohol     Types: 2 Shots of liquor per week     Comment: SOC    Drug use: Never    Sexual activity: Yes     Partners: Female     Birth control/protection: Same-sex partner       Family History:  Family History   Problem Relation Age of Onset    Hypertension Mother     Hyperlipidemia Mother     Mental illness Brother         BIPOLAR    Bipolar disorder Maternal Aunt     Heart attack Maternal Uncle     Cancer Maternal Grandmother         STOMACH       Past Surgical History:  Past Surgical History:   Procedure Laterality Date    MOUTH SURGERY      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 30 tablet 0    lamoTRIgine  (LaMICtal) 25 MG tablet Take 1 tab by mouth twice daily. 60 tablet 0    traZODone (DESYREL) 50 MG tablet Take 0.5-1 tablets by mouth At Night As Needed for Sleep. 30 tablet 0     No current facility-administered medications for this visit.       Review of Systems:    Review of Systems   Constitutional:  Positive for appetite change.   HENT: Negative.     Eyes: Negative.    Respiratory: Negative.     Cardiovascular: Negative.    Gastrointestinal: Negative.    Endocrine: Negative.    Genitourinary: Negative.    Musculoskeletal:  Positive for back pain.   Skin: Negative.    Allergic/Immunologic: Negative.    Neurological: Negative.  Negative for seizures.   Hematological: Negative.    Psychiatric/Behavioral:  Positive for stress. The patient is nervous/anxious.          Physical Exam:   Physical Exam  Constitutional:       Appearance: Normal appearance.   HENT:      Head: Normocephalic.      Nose: Nose normal.   Pulmonary:      Effort: Pulmonary effort is normal.   Musculoskeletal:         General: Normal range of motion.      Cervical back: Normal range of motion.   Neurological:      Mental Status: She is alert.   Psychiatric:         Attention and Perception: Attention and perception normal.         Mood and Affect: Affect normal. Mood is anxious.         Speech: Speech normal.         Behavior: Behavior normal. Behavior is cooperative.         Thought Content: Thought content normal.         Cognition and Memory: Cognition and memory normal.         Judgment: Judgment is impulsive.         Vitals:  not currently breastfeeding. There is no height or weight on file to calculate BMI.  Due to extenuating circumstances and possible current health risks associated with the patient being present in a clinical setting (with current health restrictions in place in regards to possible COVID 19 transmission/exposure), the patient was seen remotely today via a MyChart Video Visit through [employer].  Unable to obtain vital signs due  to nature of remote visit.  Height stated at 5ft4.5 inches.  Weight stated at 160 pounds.    Last 3 Blood Pressure Readings:  BP Readings from Last 3 Encounters:   07/28/23 117/70   10/25/22 122/86   08/19/22 102/68       PHQ-9 Score:   PHQ-9 Total Score:  PHQ-9 Depression Screening  Little interest or pleasure in doing things? (P) 0-->not at all   Feeling down, depressed, or hopeless? (P) 0-->not at all   Trouble falling or staying asleep, or sleeping too much? (P) 1-->several days   Feeling tired or having little energy? (P) 1-->several days   Poor appetite or overeating? (P) 1-->several days   Feeling bad about yourself - or that you are a failure or have let yourself or your family down? (P) 0-->not at all   Trouble concentrating on things, such as reading the newspaper or watching television? (P) 1-->several days   Moving or speaking so slowly that other people could have noticed? Or the opposite - being so fidgety or restless that you have been moving around a lot more than usual? (P) 0-->not at all   Thoughts that you would be better off dead, or of hurting yourself in some way? (P) 0-->not at all   PHQ-9 Total Score (P) 4   If you checked off any problems, how difficult have these problems made it for you to do your work, take care of things at home, or get along with other people? (P) not difficult at all         ISIDRO-7 Score:   Feeling nervous, anxious or on edge: (P) More than half the days  Not being able to stop or control worrying: (P) Not at all  Worrying too much about different things: (P) Not at all  Trouble Relaxing: (P) Not at all  Being so restless that it is hard to sit still: (P) Several days  Feeling afraid as if something awful might happen: (P) Not at all  Becoming easily annoyed or irritable: (P) Several days  ISIDRO 7 Total Score: (P) 4  If you checked any problems, how difficult have these problems made it for you to do your work, take care of things at home, or get along with other people:  (P) Not difficult at all     Mental Status Exam:   Hygiene:   good  Cooperation:  Cooperative  Eye Contact:  Good  Psychomotor Behavior:  Appropriate  Affect:  Full range  Mood: normal  Hopelessness: Denies  Speech:  Normal  Thought Process:  Goal directed and Linear  Thought Content:  Normal  Suicidal:  None  Homicidal:  None  Hallucinations:  None  Delusion:  None  Memory:  Intact  Orientation:  Grossly intact  Reliability:  good  Insight:  Fair  Judgement:  Impaired  Impulse Control:  Impaired  Physical/Medical Issues:  No        Lab Results:   No visits with results within 6 Month(s) from this visit.   Latest known visit with results is:   Office Visit on 07/08/2022   Component Date Value Ref Range Status    Glucose 07/08/2022 76  65 - 99 mg/dL Final    BUN 07/08/2022 8  6 - 20 mg/dL Final    Creatinine 07/08/2022 0.71  0.57 - 1.00 mg/dL Final    Sodium 07/08/2022 137  136 - 145 mmol/L Final    Potassium 07/08/2022 4.1  3.5 - 5.2 mmol/L Final    Chloride 07/08/2022 103  98 - 107 mmol/L Final    CO2 07/08/2022 24.6  22.0 - 29.0 mmol/L Final    Calcium 07/08/2022 9.6  8.6 - 10.5 mg/dL Final    Total Protein 07/08/2022 7.6  6.0 - 8.5 g/dL Final    Albumin 07/08/2022 4.80  3.50 - 5.20 g/dL Final    ALT (SGPT) 07/08/2022 24  1 - 33 U/L Final    AST (SGOT) 07/08/2022 45 (H)  1 - 32 U/L Final    Alkaline Phosphatase 07/08/2022 52  39 - 117 U/L Final    Total Bilirubin 07/08/2022 0.3  0.0 - 1.2 mg/dL Final    Globulin 07/08/2022 2.8  gm/dL Final    A/G Ratio 07/08/2022 1.7  g/dL Final    BUN/Creatinine Ratio 07/08/2022 11.3  7.0 - 25.0 Final    Anion Gap 07/08/2022 9.4  5.0 - 15.0 mmol/L Final    eGFR 07/08/2022 125.0  >60.0 mL/min/1.73 Final    National Kidney Foundation and American Society of Nephrology (ASN) Task Force recommended calculation based on the Chronic Kidney Disease Epidemiology Collaboration (CKD-EPI) equation refit without adjustment for race.    WBC 07/08/2022 5.08  3.40 - 10.80 10*3/mm3 Final    RBC  07/08/2022 4.55  3.77 - 5.28 10*6/mm3 Final    Hemoglobin 07/08/2022 12.8  12.0 - 15.9 g/dL Final    Hematocrit 07/08/2022 38.4  34.0 - 46.6 % Final    MCV 07/08/2022 84.4  79.0 - 97.0 fL Final    MCH 07/08/2022 28.1  26.6 - 33.0 pg Final    MCHC 07/08/2022 33.3  31.5 - 35.7 g/dL Final    RDW 07/08/2022 15.1  12.3 - 15.4 % Final    RDW-SD 07/08/2022 45.3  37.0 - 54.0 fl Final    MPV 07/08/2022 9.6  6.0 - 12.0 fL Final    Platelets 07/08/2022 291  140 - 450 10*3/mm3 Final    Neutrophil % 07/08/2022 48.6  42.7 - 76.0 % Final    Lymphocyte % 07/08/2022 39.2  19.6 - 45.3 % Final    Monocyte % 07/08/2022 9.6  5.0 - 12.0 % Final    Eosinophil % 07/08/2022 1.6  0.3 - 6.2 % Final    Basophil % 07/08/2022 0.8  0.0 - 1.5 % Final    Immature Grans % 07/08/2022 0.2  0.0 - 0.5 % Final    Neutrophils, Absolute 07/08/2022 2.47  1.70 - 7.00 10*3/mm3 Final    Lymphocytes, Absolute 07/08/2022 1.99  0.70 - 3.10 10*3/mm3 Final    Monocytes, Absolute 07/08/2022 0.49  0.10 - 0.90 10*3/mm3 Final    Eosinophils, Absolute 07/08/2022 0.08  0.00 - 0.40 10*3/mm3 Final    Basophils, Absolute 07/08/2022 0.04  0.00 - 0.20 10*3/mm3 Final    Immature Grans, Absolute 07/08/2022 0.01  0.00 - 0.05 10*3/mm3 Final    nRBC 07/08/2022 0.0  0.0 - 0.2 /100 WBC Final    TSH 07/08/2022 1.940  0.270 - 4.200 uIU/mL Final    Total Cholesterol 07/08/2022 178  0 - 200 mg/dL Final    Triglycerides 07/08/2022 43  0 - 150 mg/dL Final    HDL Cholesterol 07/08/2022 67 (H)  40 - 60 mg/dL Final    LDL Cholesterol  07/08/2022 102 (H)  0 - 100 mg/dL Final    VLDL Cholesterol 07/08/2022 9  5 - 40 mg/dL Final    LDL/HDL Ratio 07/08/2022 1.53   Final    Iron 07/08/2022 26 (L)  37 - 145 mcg/dL Final    Color, UA 07/08/2022 Yellow  Yellow, Straw Final    Appearance, UA 07/08/2022 Cloudy (A)  Clear Final    pH, UA 07/08/2022 6.0  5.0 - 8.0 Final    Specific Gravity, UA 07/08/2022 1.024  1.005 - 1.030 Final    Glucose, UA 07/08/2022 Negative  Negative Final    Ketones, UA  07/08/2022 Negative  Negative Final    Bilirubin, UA 07/08/2022 Negative  Negative Final    Blood, UA 07/08/2022 Negative  Negative Final    Protein, UA 07/08/2022 Negative  Negative Final    Leuk Esterase, UA 07/08/2022 Negative  Negative Final    Nitrite, UA 07/08/2022 Negative  Negative Final    Urobilinogen, UA 07/08/2022 0.2 E.U./dL  0.2 - 1.0 E.U./dL Final    Hepatitis C Ab 07/08/2022 Non-Reactive  Non-Reactive Final         Assessment & Plan   Problems Addressed this Visit    None  Visit Diagnoses       Panic anxiety syndrome        Relevant Medications    citalopram (CeleXA) 20 MG tablet    Dysthymia  (Chronic)       Relevant Medications    citalopram (CeleXA) 20 MG tablet    lamoTRIgine (LaMICtal) 25 MG tablet    Medication refill        Relevant Medications    citalopram (CeleXA) 20 MG tablet    lamoTRIgine (LaMICtal) 25 MG tablet    Bipolar affective disorder, currently depressed, moderate        Relevant Medications    citalopram (CeleXA) 20 MG tablet    lamoTRIgine (LaMICtal) 25 MG tablet          Diagnoses         Codes Comments    Panic anxiety syndrome     ICD-10-CM: F41.0  ICD-9-CM: 300.01     Dysthymia     ICD-10-CM: F34.1  ICD-9-CM: 300.4     Medication refill     ICD-10-CM: Z76.0  ICD-9-CM: V68.1     Bipolar affective disorder, currently depressed, moderate     ICD-10-CM: F31.32  ICD-9-CM: 296.52             Visit Diagnoses:    ICD-10-CM ICD-9-CM   1. Panic anxiety syndrome  F41.0 300.01   2. Dysthymia  F34.1 300.4   3. Medication refill  Z76.0 V68.1   4. Bipolar affective disorder, currently depressed, moderate  F31.32 296.52       Patient voices stability with current medications and thus Celexa and Lamictal refilled at this time.  She does not need trazodone refilled currently. Previously educated upon side effects with use of these medications as well as when to present for emergency services, denies at this time.  Will reevaluate current medications and assess if increase is needed once  resuming school later this month.  Follow up with this provider in 4 weeks or sooner if needed.    Discussed need marijuana use with current medications could exacerbate mental health symptoms, leading to further depression or psychosis. There is also some research to support psychotropics and mood stabilizers with marijuana use could reduce efficacy of medications. may increase side effects such as dizziness, drowsiness, confusion, and difficulty concentrating. Some people, especially the elderly, may also experience impairment in thinking, judgment, and motor coordination. Discussed with patient need to practice cessation, verbalized understanding.    GOALS:  Short Term Goals: Patient will be compliant with medication, and patient will have no significant medication related side effects.  Patient will be engaged in psychotherapy as indicated.  Patient will report subjective improvement of symptoms.  Long term goals: To stabilize mood and treat/improve subjective symptoms, the patient will stay out of the hospital, the patient will be at an optimal level of functioning, and the patient will take all medications as prescribed.  The patient/guardian verbalized understanding and agreement with goals that were mutually set.      TREATMENT PLAN: Continue supportive psychotherapy efforts and medications as indicated.  Pharmacological and Non-Pharmacological treatment options discussed during today's visit. Patient/Guardian acknowledged and verbally consented with current treatment plan and was educated on the importance of compliance with treatment and follow-up appointments.      MEDICATION ISSUES:  Discussed medication options and treatment plan of prescribed medication as well as the risks, benefits, any black box warnings, and side effects including potential falls, possible impaired driving, and metabolic adversities among others. Patient is agreeable to call the office with any worsening of symptoms or onset of  side effects, or if any concerns or questions arise.  The contact information for the office is made available to the patient. Patient is agreeable to call 911 or go to the nearest ER should they begin having any SI/HI, or if any urgent concerns arise. No medication side effects or related complaints today.     MEDS ORDERED DURING VISIT:  New Medications Ordered This Visit   Medications    citalopram (CeleXA) 20 MG tablet     Sig: Take 1 tablet by mouth Daily.     Dispense:  30 tablet     Refill:  0    lamoTRIgine (LaMICtal) 25 MG tablet     Sig: Take 1 tab by mouth twice daily.     Dispense:  60 tablet     Refill:  0       Follow Up Appointment:   Return in about 4 weeks (around 2/7/2024) for Recheck.             This document has been electronically signed by RIVER Casper  January 10, 2024 08:57 EST    Some of the data in this electronic note has been brought forward from a previous encounter, any necessary changes have been made, it has been reviewed by this APRN, and it is accurate.      Dictated Utilizing Dragon Dictation: Part of this note may be an electronic transcription/translation of spoken language to printed text using the Dragon Dictation System.

## 2024-02-06 ENCOUNTER — TELEMEDICINE (OUTPATIENT)
Dept: PSYCHIATRY | Facility: CLINIC | Age: 24
End: 2024-02-06
Payer: COMMERCIAL

## 2024-02-06 DIAGNOSIS — F31.32 BIPOLAR AFFECTIVE DISORDER, CURRENTLY DEPRESSED, MODERATE: Primary | ICD-10-CM

## 2024-02-06 DIAGNOSIS — G47.9 SLEEP DISTURBANCE: ICD-10-CM

## 2024-02-06 DIAGNOSIS — Z76.0 MEDICATION REFILL: ICD-10-CM

## 2024-02-06 DIAGNOSIS — F34.1 DYSTHYMIA: Chronic | ICD-10-CM

## 2024-02-06 DIAGNOSIS — F41.0 PANIC ANXIETY SYNDROME: ICD-10-CM

## 2024-02-06 PROCEDURE — 99214 OFFICE O/P EST MOD 30 MIN: CPT

## 2024-02-06 RX ORDER — CITALOPRAM 20 MG/1
20 TABLET ORAL DAILY
Qty: 30 TABLET | Refills: 0 | Status: SHIPPED | OUTPATIENT
Start: 2024-02-06

## 2024-02-06 RX ORDER — LAMOTRIGINE 25 MG/1
TABLET ORAL
Qty: 30 TABLET | Refills: 0 | Status: SHIPPED | OUTPATIENT
Start: 2024-02-06

## 2024-02-06 RX ORDER — TRAZODONE HYDROCHLORIDE 50 MG/1
25-50 TABLET ORAL NIGHTLY PRN
Qty: 30 TABLET | Refills: 0 | Status: SHIPPED | OUTPATIENT
Start: 2024-02-06

## 2024-02-06 NOTE — PROGRESS NOTES
"  This provider is located at the [facility] (through [facility]), [address], [place], [address] using a secure MyChart Video Visit through Language Learning [employer]. Patient is being seen remotely via telehealth at their home address in [place], and stated they are in a secure environment for this session. The patient's condition being diagnosed/treated is appropriate for telemedicine. The provider identified herself as well as her credentials.   The patient, and/or patients guardian, consent to be seen remotely, and when consent is given they understand that the consent allows for patient identifiable information to be sent to a third party as needed.   They may refuse to be seen remotely at any time. The electronic data is encrypted and password protected, and the patient and/or guardian has been advised of the potential risks to privacy not withstanding such measures.    You have chosen to receive care through a telehealth visit.  Do you consent to use a video/audio connection for your medical care today? Yes    Patient identifiers utilized: Name and date of birth.    Patient verbally confirmed consent for today's encounter:  February 6, 2024    Levi Brown is a 23 y.o. female who presents today for follow up    Chief Complaint:    Chief Complaint   Patient presents with    Anxiety    Depression    Med Management    Sleeping Problem    Irritable    Panic Attack        Referring Provider: Jayme Hennessy LCSW    History of Present Illness:    History of Present Illness  Patient is a 23-year-old female presenting for a 1 month follow-up related to dysthymia, irritability, depression and anxiety as well as sleep disturbances.  Patient voices compliance with Celexa daily as well as Lamictal in the morning, will often forget p.m. dose, has not had p.m. dose in over a week, has missed \"quite a few times.\"  Sleep is \"good\" trazodone \"definitely works.\"  States she utilizes " "\"here and there, a half tab.\"  Regarding irritability and mood swings \"I do not have any.  I actually do feel a difference.\"  Regarding dysthymia \"not as much as I used to.\"  Denies recent panic attacks.  PHQ further reduced from 4-3, minimal for depression which patient currently rates as 0/10.  ISIDRO also reduced from 4 1, minimal for anxiety which patient rates a 1/10.  Patient states \"school has been hard and stressful but I have been okay.  A little anxiety not too bad or anything there is just a lot of stuff to do.\"  She denies SI, HI, SIB, or hallucinations currently and is convincing.  Denies side effects to medication.  Denies medical status changes.    Patient resides in her college dorm on campus at [employer].  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  She is on break from school, this will resume in 2 weeks. She plays basketball.  Cites her parents remain together, relationship with them is improving.  Good relationships with her brother and her sister.  She is single, no children.  Does have a significant other.  Denies previous arrests.  [employer] preference.  Enjoys playing basketball and playing Xbox.  States frequent marijuana use, multiple times daily. Not currently in counseling.       Last Menstrual Period:  Couple days ago    The patient denies any chance of pregnancy at this time.  The patient was educated that her prescribed medications can have potential risk to a developing fetus. The patient is advised to contact this APRN/this office if she becomes pregnant or plans to become pregnant.  Pt verbalizes understanding and acknowledged agreement with this plan in her own words.      The following portions of the patient's history were reviewed and updated as appropriate: allergies, current medications, past family history, past medical history, past social history, past surgical history and problem list.    Past Psychiatric History:  Began Treatment: "  year  Diagnoses:Anxiety  Psychiatrist:[facility]  Therapist: once a month  Admission History:Denies  Medication Trials: zoloft (I didn't eat on those and gained weight), seroquel (groggy)  Self Harm: Denies  Suicide Attempts:Denies   Psychosis, Anxiety, Depression: Denies    Past Medical History:  Past Medical History:   Diagnosis Date    Anxiety     Depression     Insomnia        Substance Abuse History:   Types:Denies all, including illicit  Withdrawal Symptoms:Denies  Longest Period Sober:Not Applicable   AA: Not applicable     Social History:  Social History     Socioeconomic History    Marital status: Single    Number of children: 0   Tobacco Use    Smoking status: Never    Smokeless tobacco: Never   Vaping Use    Vaping Use: Every day    Substances: Nicotine    Devices: Disposable   Substance and Sexual Activity    Alcohol use: Yes     Alcohol/week: 2.0 standard drinks of alcohol     Types: 2 Shots of liquor per week     Comment: SOC    Drug use: Never    Sexual activity: Yes     Partners: Female     Birth control/protection: Same-sex partner       Family History:  Family History   Problem Relation Age of Onset    Hypertension Mother     Hyperlipidemia Mother     Mental illness Brother         BIPOLAR    Bipolar disorder Maternal Aunt     Heart attack Maternal Uncle     Cancer Maternal Grandmother         STOMACH       Past Surgical History:  Past Surgical History:   Procedure Laterality Date    MOUTH SURGERY      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 30 tablet 0    lamoTRIgine (LaMICtal) 25 MG tablet Take 1 tab by mouth twice daily. 30 tablet 0    traZODone (DESYREL) 50 MG tablet  Take 0.5-1 tablets by mouth At Night As Needed for Sleep. 30 tablet 0     No current facility-administered medications for this visit.       Review of Systems:    Review of Systems   Constitutional: Negative.    HENT: Negative.     Eyes: Negative.    Respiratory: Negative.     Cardiovascular: Negative.    Gastrointestinal: Negative.    Endocrine: Negative.    Genitourinary: Negative.    Musculoskeletal:  Positive for back pain.   Skin: Negative.    Allergic/Immunologic: Negative.    Neurological: Negative.  Negative for seizures.   Hematological: Negative.    Psychiatric/Behavioral:  Positive for stress.          Physical Exam:   Physical Exam  Constitutional:       Appearance: Normal appearance.   HENT:      Head: Normocephalic.      Nose: Nose normal.   Pulmonary:      Effort: Pulmonary effort is normal.   Musculoskeletal:         General: Normal range of motion.      Cervical back: Normal range of motion.   Neurological:      Mental Status: She is alert.   Psychiatric:         Attention and Perception: Attention and perception normal.         Mood and Affect: Affect normal.         Speech: Speech normal.         Behavior: Behavior normal. Behavior is cooperative.         Thought Content: Thought content normal.         Cognition and Memory: Cognition and memory normal.         Judgment: Judgment is impulsive.         Vitals:  not currently breastfeeding. There is no height or weight on file to calculate BMI.  Due to extenuating circumstances and possible current health risks associated with the patient being present in a clinical setting (with current health restrictions in place in regards to possible COVID 19 transmission/exposure), the patient was seen remotely today via a MyChart Video Visit through [employer].  Unable to obtain vital signs due to nature of remote visit.  Height stated at 5ft4.5 inches.  Weight stated at 160 pounds.    Last 3 Blood Pressure Readings:  BP Readings from Last 3 Encounters:   07/28/23  117/70   10/25/22 122/86   08/19/22 102/68       PHQ-9 Score:   PHQ-9 Total Score:  PHQ-9 Depression Screening  Little interest or pleasure in doing things? (P) 0-->not at all   Feeling down, depressed, or hopeless? (P) 0-->not at all   Trouble falling or staying asleep, or sleeping too much? (P) 1-->several days   Feeling tired or having little energy? (P) 1-->several days   Poor appetite or overeating? (P) 0-->not at all   Feeling bad about yourself - or that you are a failure or have let yourself or your family down? (P) 0-->not at all   Trouble concentrating on things, such as reading the newspaper or watching television? (P) 1-->several days   Moving or speaking so slowly that other people could have noticed? Or the opposite - being so fidgety or restless that you have been moving around a lot more than usual? (P) 0-->not at all   Thoughts that you would be better off dead, or of hurting yourself in some way? (P) 0-->not at all   PHQ-9 Total Score (P) 3   If you checked off any problems, how difficult have these problems made it for you to do your work, take care of things at home, or get along with other people? (P) not difficult at all         ISIDRO-7 Score:   Feeling nervous, anxious or on edge: (P) Several days  Not being able to stop or control worrying: (P) Not at all  Worrying too much about different things: (P) Not at all  Trouble Relaxing: (P) Not at all  Being so restless that it is hard to sit still: (P) Not at all  Feeling afraid as if something awful might happen: (P) Not at all  Becoming easily annoyed or irritable: (P) Not at all  ISIDRO 7 Total Score: (P) 1  If you checked any problems, how difficult have these problems made it for you to do your work, take care of things at home, or get along with other people: (P) Not difficult at all     Mental Status Exam:   Hygiene:   good  Cooperation:  Cooperative  Eye Contact:  Good  Psychomotor Behavior:  Appropriate  Affect:  Full range  Mood:  normal  Hopelessness: Denies  Speech:  Normal  Thought Process:  Goal directed and Linear  Thought Content:  Normal  Suicidal:  None  Homicidal:  None  Hallucinations:  None  Delusion:  None  Memory:  Intact  Orientation:  Grossly intact  Reliability:  good  Insight:  Fair  Judgement:  Impaired  Impulse Control:  Impaired  Physical/Medical Issues:  No        Lab Results:   No visits with results within 6 Month(s) from this visit.   Latest known visit with results is:   Office Visit on 07/08/2022   Component Date Value Ref Range Status    Glucose 07/08/2022 76  65 - 99 mg/dL Final    BUN 07/08/2022 8  6 - 20 mg/dL Final    Creatinine 07/08/2022 0.71  0.57 - 1.00 mg/dL Final    Sodium 07/08/2022 137  136 - 145 mmol/L Final    Potassium 07/08/2022 4.1  3.5 - 5.2 mmol/L Final    Chloride 07/08/2022 103  98 - 107 mmol/L Final    CO2 07/08/2022 24.6  22.0 - 29.0 mmol/L Final    Calcium 07/08/2022 9.6  8.6 - 10.5 mg/dL Final    Total Protein 07/08/2022 7.6  6.0 - 8.5 g/dL Final    Albumin 07/08/2022 4.80  3.50 - 5.20 g/dL Final    ALT (SGPT) 07/08/2022 24  1 - 33 U/L Final    AST (SGOT) 07/08/2022 45 (H)  1 - 32 U/L Final    Alkaline Phosphatase 07/08/2022 52  39 - 117 U/L Final    Total Bilirubin 07/08/2022 0.3  0.0 - 1.2 mg/dL Final    Globulin 07/08/2022 2.8  gm/dL Final    A/G Ratio 07/08/2022 1.7  g/dL Final    BUN/Creatinine Ratio 07/08/2022 11.3  7.0 - 25.0 Final    Anion Gap 07/08/2022 9.4  5.0 - 15.0 mmol/L Final    eGFR 07/08/2022 125.0  >60.0 mL/min/1.73 Final    National Kidney Foundation and American Society of Nephrology (ASN) Task Force recommended calculation based on the Chronic Kidney Disease Epidemiology Collaboration (CKD-EPI) equation refit without adjustment for race.    WBC 07/08/2022 5.08  3.40 - 10.80 10*3/mm3 Final    RBC 07/08/2022 4.55  3.77 - 5.28 10*6/mm3 Final    Hemoglobin 07/08/2022 12.8  12.0 - 15.9 g/dL Final    Hematocrit 07/08/2022 38.4  34.0 - 46.6 % Final    MCV 07/08/2022 84.4  79.0  - 97.0 fL Final    MCH 07/08/2022 28.1  26.6 - 33.0 pg Final    MCHC 07/08/2022 33.3  31.5 - 35.7 g/dL Final    RDW 07/08/2022 15.1  12.3 - 15.4 % Final    RDW-SD 07/08/2022 45.3  37.0 - 54.0 fl Final    MPV 07/08/2022 9.6  6.0 - 12.0 fL Final    Platelets 07/08/2022 291  140 - 450 10*3/mm3 Final    Neutrophil % 07/08/2022 48.6  42.7 - 76.0 % Final    Lymphocyte % 07/08/2022 39.2  19.6 - 45.3 % Final    Monocyte % 07/08/2022 9.6  5.0 - 12.0 % Final    Eosinophil % 07/08/2022 1.6  0.3 - 6.2 % Final    Basophil % 07/08/2022 0.8  0.0 - 1.5 % Final    Immature Grans % 07/08/2022 0.2  0.0 - 0.5 % Final    Neutrophils, Absolute 07/08/2022 2.47  1.70 - 7.00 10*3/mm3 Final    Lymphocytes, Absolute 07/08/2022 1.99  0.70 - 3.10 10*3/mm3 Final    Monocytes, Absolute 07/08/2022 0.49  0.10 - 0.90 10*3/mm3 Final    Eosinophils, Absolute 07/08/2022 0.08  0.00 - 0.40 10*3/mm3 Final    Basophils, Absolute 07/08/2022 0.04  0.00 - 0.20 10*3/mm3 Final    Immature Grans, Absolute 07/08/2022 0.01  0.00 - 0.05 10*3/mm3 Final    nRBC 07/08/2022 0.0  0.0 - 0.2 /100 WBC Final    TSH 07/08/2022 1.940  0.270 - 4.200 uIU/mL Final    Total Cholesterol 07/08/2022 178  0 - 200 mg/dL Final    Triglycerides 07/08/2022 43  0 - 150 mg/dL Final    HDL Cholesterol 07/08/2022 67 (H)  40 - 60 mg/dL Final    LDL Cholesterol  07/08/2022 102 (H)  0 - 100 mg/dL Final    VLDL Cholesterol 07/08/2022 9  5 - 40 mg/dL Final    LDL/HDL Ratio 07/08/2022 1.53   Final    Iron 07/08/2022 26 (L)  37 - 145 mcg/dL Final    Color, UA 07/08/2022 Yellow  Yellow, Straw Final    Appearance, UA 07/08/2022 Cloudy (A)  Clear Final    pH, UA 07/08/2022 6.0  5.0 - 8.0 Final    Specific Gravity, UA 07/08/2022 1.024  1.005 - 1.030 Final    Glucose, UA 07/08/2022 Negative  Negative Final    Ketones, UA 07/08/2022 Negative  Negative Final    Bilirubin, UA 07/08/2022 Negative  Negative Final    Blood, UA 07/08/2022 Negative  Negative Final    Protein, UA 07/08/2022 Negative  Negative  Final    Leuk Esterase, UA 07/08/2022 Negative  Negative Final    Nitrite, UA 07/08/2022 Negative  Negative Final    Urobilinogen, UA 07/08/2022 0.2 E.U./dL  0.2 - 1.0 E.U./dL Final    Hepatitis C Ab 07/08/2022 Non-Reactive  Non-Reactive Final         Assessment & Plan   Problems Addressed this Visit    None  Visit Diagnoses       Bipolar affective disorder, currently depressed, moderate    -  Primary    Relevant Medications    citalopram (CeleXA) 20 MG tablet    traZODone (DESYREL) 50 MG tablet    lamoTRIgine (LaMICtal) 25 MG tablet    Panic anxiety syndrome        Relevant Medications    citalopram (CeleXA) 20 MG tablet    traZODone (DESYREL) 50 MG tablet    Dysthymia  (Chronic)       Relevant Medications    citalopram (CeleXA) 20 MG tablet    traZODone (DESYREL) 50 MG tablet    lamoTRIgine (LaMICtal) 25 MG tablet    Sleep disturbance        Relevant Medications    traZODone (DESYREL) 50 MG tablet    Medication refill        Relevant Medications    citalopram (CeleXA) 20 MG tablet    traZODone (DESYREL) 50 MG tablet    lamoTRIgine (LaMICtal) 25 MG tablet          Diagnoses         Codes Comments    Bipolar affective disorder, currently depressed, moderate    -  Primary ICD-10-CM: F31.32  ICD-9-CM: 296.52     Panic anxiety syndrome     ICD-10-CM: F41.0  ICD-9-CM: 300.01     Dysthymia     ICD-10-CM: F34.1  ICD-9-CM: 300.4     Sleep disturbance     ICD-10-CM: G47.9  ICD-9-CM: 780.50     Medication refill     ICD-10-CM: Z76.0  ICD-9-CM: V68.1             Visit Diagnoses:    ICD-10-CM ICD-9-CM   1. Bipolar affective disorder, currently depressed, moderate  F31.32 296.52   2. Panic anxiety syndrome  F41.0 300.01   3. Dysthymia  F34.1 300.4   4. Sleep disturbance  G47.9 780.50   5. Medication refill  Z76.0 V68.1       Encouraged compliance with use of Lamictal, take 50 mg daily x 2 weeks.  If well tolerated with good effects, call and inform this provider and we will change form to extended release once daily.  Trazodone  and Celexa refilled. Previously educated upon side effects with use of these medications as well as when to present for emergency services, denies at this time.  Continue counseling as needed.  Follow up with this provider in 4 weeks or sooner if needed.      Discussed need marijuana use with current medications could exacerbate mental health symptoms, leading to further depression or psychosis. There is also some research to support psychotropics and mood stabilizers with marijuana use could reduce efficacy of medications. may increase side effects such as dizziness, drowsiness, confusion, and difficulty concentrating. Some people, especially the elderly, may also experience impairment in thinking, judgment, and motor coordination. Discussed with patient need to practice cessation, verbalized understanding.    GOALS:  Short Term Goals: Patient will be compliant with medication, and patient will have no significant medication related side effects.  Patient will be engaged in psychotherapy as indicated.  Patient will report subjective improvement of symptoms.  Long term goals: To stabilize mood and treat/improve subjective symptoms, the patient will stay out of the hospital, the patient will be at an optimal level of functioning, and the patient will take all medications as prescribed.  The patient/guardian verbalized understanding and agreement with goals that were mutually set.      TREATMENT PLAN: Continue supportive psychotherapy efforts and medications as indicated.  Pharmacological and Non-Pharmacological treatment options discussed during today's visit. Patient/Guardian acknowledged and verbally consented with current treatment plan and was educated on the importance of compliance with treatment and follow-up appointments.      MEDICATION ISSUES:  Discussed medication options and treatment plan of prescribed medication as well as the risks, benefits, any black box warnings, and side effects including potential  falls, possible impaired driving, and metabolic adversities among others. Patient is agreeable to call the office with any worsening of symptoms or onset of side effects, or if any concerns or questions arise.  The contact information for the office is made available to the patient. Patient is agreeable to call 911 or go to the nearest ER should they begin having any SI/HI, or if any urgent concerns arise. No medication side effects or related complaints today.     MEDS ORDERED DURING VISIT:  New Medications Ordered This Visit   Medications    citalopram (CeleXA) 20 MG tablet     Sig: Take 1 tablet by mouth Daily.     Dispense:  30 tablet     Refill:  0    traZODone (DESYREL) 50 MG tablet     Sig: Take 0.5-1 tablets by mouth At Night As Needed for Sleep.     Dispense:  30 tablet     Refill:  0    lamoTRIgine (LaMICtal) 25 MG tablet     Sig: Take 1 tab by mouth twice daily.     Dispense:  30 tablet     Refill:  0       Follow Up Appointment:   Return in about 4 weeks (around 3/5/2024) for Recheck.             This document has been electronically signed by RIVER Casper  February 6, 2024 11:25 EST    Some of the data in this electronic note has been brought forward from a previous encounter, any necessary changes have been made, it has been reviewed by this APRN, and it is accurate.      Dictated Utilizing Dragon Dictation: Part of this note may be an electronic transcription/translation of spoken language to printed text using the Dragon Dictation System.

## 2024-03-05 ENCOUNTER — TELEMEDICINE (OUTPATIENT)
Dept: PSYCHIATRY | Facility: CLINIC | Age: 24
End: 2024-03-05
Payer: COMMERCIAL

## 2024-03-05 DIAGNOSIS — Z76.0 MEDICATION REFILL: ICD-10-CM

## 2024-03-05 DIAGNOSIS — F41.0 PANIC ANXIETY SYNDROME: Primary | ICD-10-CM

## 2024-03-05 DIAGNOSIS — G47.9 SLEEP DISTURBANCE: ICD-10-CM

## 2024-03-05 DIAGNOSIS — F31.32 BIPOLAR AFFECTIVE DISORDER, CURRENTLY DEPRESSED, MODERATE: ICD-10-CM

## 2024-03-05 DIAGNOSIS — F34.1 DYSTHYMIA: Chronic | ICD-10-CM

## 2024-03-05 RX ORDER — LAMOTRIGINE 50 MG/1
50 TABLET, ORALLY DISINTEGRATING ORAL DAILY
Qty: 30 TABLET | Refills: 0 | Status: SHIPPED | OUTPATIENT
Start: 2024-03-05

## 2024-03-05 RX ORDER — CITALOPRAM 20 MG/1
20 TABLET ORAL DAILY
Qty: 30 TABLET | Refills: 0 | Status: SHIPPED | OUTPATIENT
Start: 2024-03-05

## 2024-03-05 RX ORDER — PROPRANOLOL HYDROCHLORIDE 10 MG/1
10 TABLET ORAL DAILY PRN
Qty: 30 TABLET | Refills: 0 | Status: SHIPPED | OUTPATIENT
Start: 2024-03-05

## 2024-03-05 NOTE — PROGRESS NOTES
This provider is located at the [facility] (through [facility]), [address], [facility], [address] using a secure MyChart Video Visit through [employer]. Patient is being seen remotely via telehealth at their home address in [place], and stated they are in a secure environment for this session. The patient's condition being diagnosed/treated is appropriate for telemedicine. The provider identified herself as well as her credentials.   The patient, and/or patients guardian, consent to be seen remotely, and when consent is given they understand that the consent allows for patient identifiable information to be sent to a third party as needed.   They may refuse to be seen remotely at any time. The electronic data is encrypted and password protected, and the patient and/or guardian has been advised of the potential risks to privacy not withstanding such measures.    You have chosen to receive care through a telehealth visit.  Do you consent to use a video/audio connection for your medical care today? Yes    Patient identifiers utilized: Name and date of birth.    Patient verbally confirmed consent for today's encounter:  March 5, 2024    Levi Brown is a 23 y.o. female who presents today for follow up    Chief Complaint:    Chief Complaint   Patient presents with    Anxiety    Depression    Med Management    Sleeping Problem    Irritable        Referring Provider: Jayme Hennessy LCSW    History of Present Illness:    History of Present Illness  Patient is a 23-year-old female presenting for a 1 month follow-up related to dysthymia, irritability, depression and anxiety as well as sleep disturbances.  Patient voices compliance with Celexa as well as Lamictal, now taking 50 mg consistently.  She states she likes her medications where they are at, denies side effects.  PHQ increased from 3-10, indicating moderate depression which patient denies, rating this  "condition a 0/10.  ISIDRO also increased from 1-9, indicating mild anxiety which patient rates a 5/10 \"I am very stressed, my [employer] paper is due on Thursday.  I would not have anxiety as much as I do now without the paper.\"  Cites 6 to 7 hours of sleep nightly \"going and staying asleep pretty well\" with use of trazodone occasionally to help.  She states \"last week mental breakdown about not getting my stuff in and time, I am more stressed and anxious.\"  She denies SI, HI, SIB, or hallucinations currently and is convincing.  Irritability currently controlled as well as mood swings but states she has been \"a little irritable, I am about to start my period.  My emotions are fine.\"  She has noticed some impulsive behaviors, drinking approximately 2 shots every day but states this is related to peer pressure from others on her team.    Patient resides in her college dorm on campus at [place].  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   She plays basketball, tournament starts next week.  Cites her parents remain together, relationship with them is improving.  Good relationships with her brother and her sister.  She is single, no children.  Does have a significant other.  Denies previous arrests.  [employer] preference.  Enjoys playing basketball and playing Xbox.  States frequent marijuana use. Not currently in counseling.       Last Menstrual Period:  3 weeks ago    The patient denies any chance of pregnancy at this time.  The patient was educated that her prescribed medications can have potential risk to a developing fetus. The patient is advised to contact this APRN/this office if she becomes pregnant or plans to become pregnant.  Pt verbalizes understanding and acknowledged agreement with this plan in her own words.      The following portions of the patient's history were reviewed and updated as appropriate: allergies, current medications, past family history, past medical " history, past social history, past surgical history and problem list.    Past Psychiatric History:  Began Treatment: sophomore year  Diagnoses:Anxiety  Psychiatrist:[facility]  Therapist: once a month  Admission History:Denies  Medication Trials: zoloft (I didn't eat on those and gained weight), seroquel (groggy), atarax (vomiting)  Self Harm: Denies  Suicide Attempts:Denies   Psychosis, Anxiety, Depression: Denies    Past Medical History:  Past Medical History:   Diagnosis Date    Anxiety     Depression     Insomnia        Substance Abuse History:   Types:Denies all, including illicit  Withdrawal Symptoms:Denies  Longest Period Sober:Not Applicable   AA: Not applicable     Social History:  Social History     Socioeconomic History    Marital status: Single    Number of children: 0   Tobacco Use    Smoking status: Never    Smokeless tobacco: Never   Vaping Use    Vaping status: Every Day    Substances: Nicotine    Devices: Disposable   Substance and Sexual Activity    Alcohol use: Yes     Alcohol/week: 2.0 standard drinks of alcohol     Types: 2 Shots of liquor per week     Comment: SOC    Drug use: Never    Sexual activity: Yes     Partners: Female     Birth control/protection: Same-sex partner       Family History:  Family History   Problem Relation Age of Onset    Hypertension Mother     Hyperlipidemia Mother     Mental illness Brother         BIPOLAR    Bipolar disorder Maternal Aunt     Heart attack Maternal Uncle     Cancer Maternal Grandmother         STOMACH       Past Surgical History:  Past Surgical History:   Procedure Laterality Date    MOUTH SURGERY      TOE AMPUTATION      PT. WAS BORN WITH EXTRA TOE       Problem List:  Patient Active Problem List   Diagnosis    Depression    Anxiety    Mood disorder    Insomnia       Allergy:   No Known Allergies     Current Medications:   Current Outpatient Medications   Medication Sig Dispense Refill    citalopram (CeleXA) 20 MG tablet Take 1 tablet by mouth  Daily. 30 tablet 0    lamoTRIgine (LaMICtal ODT) 50 MG tablet dispersible disintegrating tablet Place 1 tablet on the tongue Daily. 30 tablet 0    propranolol (INDERAL) 10 MG tablet Take 1 tablet by mouth Daily As Needed (Anxiety). 30 tablet 0    traZODone (DESYREL) 50 MG tablet Take 0.5-1 tablets by mouth At Night As Needed for Sleep. 30 tablet 0     No current facility-administered medications for this visit.       Review of Systems:    Review of Systems   Constitutional: Negative.    HENT: Negative.     Eyes: Negative.    Respiratory: Negative.     Cardiovascular: Negative.    Gastrointestinal: Negative.    Endocrine: Negative.    Genitourinary: Negative.    Musculoskeletal:  Positive for back pain.   Skin: Negative.    Allergic/Immunologic: Negative.    Neurological: Negative.  Negative for seizures.   Hematological: Negative.    Psychiatric/Behavioral:  Positive for stress. The patient is nervous/anxious.          Physical Exam:   Physical Exam  Constitutional:       Appearance: Normal appearance.   HENT:      Head: Normocephalic.      Nose: Nose normal.   Pulmonary:      Effort: Pulmonary effort is normal.   Musculoskeletal:         General: Normal range of motion.      Cervical back: Normal range of motion.   Neurological:      Mental Status: She is alert.   Psychiatric:         Attention and Perception: Attention and perception normal.         Mood and Affect: Affect normal. Mood is anxious.         Speech: Speech normal.         Behavior: Behavior normal. Behavior is cooperative.         Thought Content: Thought content normal.         Cognition and Memory: Cognition and memory normal.         Judgment: Judgment is impulsive.         Vitals:  not currently breastfeeding. There is no height or weight on file to calculate BMI.  Due to extenuating circumstances and possible current health risks associated with the patient being present in a clinical setting (with current health restrictions in place in regards  to possible COVID 19 transmission/exposure), the patient was seen remotely today via a [facility]Chart Video [facility] through [facility].  Unable to obtain vital signs due to nature of remote visit.  Height stated at 5ft4.5 inches.  Weight stated at 160 pounds.    Last 3 Blood Pressure Readings:  BP Readings from Last 3 Encounters:   07/28/23 117/70   10/25/22 122/86   08/19/22 102/68       PHQ-9 Score:   PHQ-9 Total Score:  PHQ-9 Depression Screening  Little interest or pleasure in doing things? (P) 2-->more than half the days   Feeling down, depressed, or hopeless? (P) 2-->more than half the days   Trouble falling or staying asleep, or sleeping too much? (P) 0-->not at all   Feeling tired or having little energy? (P) 1-->several days   Poor appetite or overeating?     Feeling bad about yourself - or that you are a failure or have let yourself or your family down? (P) 2-->more than half the days   Trouble concentrating on things, such as reading the newspaper or watching television? (P) 2-->more than half the days   Moving or speaking so slowly that other people could have noticed? Or the opposite - being so fidgety or restless that you have been moving around a lot more than usual? (P) 1-->several days   Thoughts that you would be better off dead, or of hurting yourself in some way? (P) 0-->not at all   PHQ-9 Total Score (P) 10   If you checked off any problems, how difficult have these problems made it for you to do your work, take care of things at home, or get along with other people? (P) very difficult         ISIDRO-7 Score:   Feeling nervous, anxious or on edge: (P) More than half the days  Not being able to stop or control worrying: Several days  Worrying too much about different things: (P) Nearly every day  Trouble Relaxing: (P) More than half the days  Being so restless that it is hard to sit still: (P) Not at all  Feeling afraid as if something awful might happen: (P) Not at all  Becoming easily annoyed or irritable: (P)  More than half the days  ISIDRO 7 Total Score: 10  If you checked any problems, how difficult have these problems made it for you to do your work, take care of things at home, or get along with other people: (P) Very difficult     Mental Status Exam:   Hygiene:   good  Cooperation:  Cooperative  Eye Contact:  Good  Psychomotor Behavior:  Appropriate  Affect:  Full range  Mood: normal  Hopelessness: 1  Speech:  Normal  Thought Process:  Goal directed and Linear  Thought Content:  Normal  Suicidal:  None  Homicidal:  None  Hallucinations:  None  Delusion:  None  Memory:  Intact  Orientation:  Grossly intact  Reliability:  good  Insight:  Fair  Judgement:  Impaired  Impulse Control:  Impaired  Physical/Medical Issues:  No        Lab Results:   No visits with results within 6 Month(s) from this visit.   Latest known visit with results is:   Office Visit on 07/08/2022   Component Date Value Ref Range Status    Glucose 07/08/2022 76  65 - 99 mg/dL Final    BUN 07/08/2022 8  6 - 20 mg/dL Final    Creatinine 07/08/2022 0.71  0.57 - 1.00 mg/dL Final    Sodium 07/08/2022 137  136 - 145 mmol/L Final    Potassium 07/08/2022 4.1  3.5 - 5.2 mmol/L Final    Chloride 07/08/2022 103  98 - 107 mmol/L Final    CO2 07/08/2022 24.6  22.0 - 29.0 mmol/L Final    Calcium 07/08/2022 9.6  8.6 - 10.5 mg/dL Final    Total Protein 07/08/2022 7.6  6.0 - 8.5 g/dL Final    Albumin 07/08/2022 4.80  3.50 - 5.20 g/dL Final    ALT (SGPT) 07/08/2022 24  1 - 33 U/L Final    AST (SGOT) 07/08/2022 45 (H)  1 - 32 U/L Final    Alkaline Phosphatase 07/08/2022 52  39 - 117 U/L Final    Total Bilirubin 07/08/2022 0.3  0.0 - 1.2 mg/dL Final    Globulin 07/08/2022 2.8  gm/dL Final    A/G Ratio 07/08/2022 1.7  g/dL Final    BUN/Creatinine Ratio 07/08/2022 11.3  7.0 - 25.0 Final    Anion Gap 07/08/2022 9.4  5.0 - 15.0 mmol/L Final    eGFR 07/08/2022 125.0  >60.0 mL/min/1.73 Final    National Kidney Foundation and American Society of Nephrology (ASN) Task Force  recommended calculation based on the Chronic Kidney Disease Epidemiology Collaboration (CKD-EPI) equation refit without adjustment for race.    WBC 07/08/2022 5.08  3.40 - 10.80 10*3/mm3 Final    RBC 07/08/2022 4.55  3.77 - 5.28 10*6/mm3 Final    Hemoglobin 07/08/2022 12.8  12.0 - 15.9 g/dL Final    Hematocrit 07/08/2022 38.4  34.0 - 46.6 % Final    MCV 07/08/2022 84.4  79.0 - 97.0 fL Final    MCH 07/08/2022 28.1  26.6 - 33.0 pg Final    MCHC 07/08/2022 33.3  31.5 - 35.7 g/dL Final    RDW 07/08/2022 15.1  12.3 - 15.4 % Final    RDW-SD 07/08/2022 45.3  37.0 - 54.0 fl Final    MPV 07/08/2022 9.6  6.0 - 12.0 fL Final    Platelets 07/08/2022 291  140 - 450 10*3/mm3 Final    Neutrophil % 07/08/2022 48.6  42.7 - 76.0 % Final    Lymphocyte % 07/08/2022 39.2  19.6 - 45.3 % Final    Monocyte % 07/08/2022 9.6  5.0 - 12.0 % Final    Eosinophil % 07/08/2022 1.6  0.3 - 6.2 % Final    Basophil % 07/08/2022 0.8  0.0 - 1.5 % Final    Immature Grans % 07/08/2022 0.2  0.0 - 0.5 % Final    Neutrophils, Absolute 07/08/2022 2.47  1.70 - 7.00 10*3/mm3 Final    Lymphocytes, Absolute 07/08/2022 1.99  0.70 - 3.10 10*3/mm3 Final    Monocytes, Absolute 07/08/2022 0.49  0.10 - 0.90 10*3/mm3 Final    Eosinophils, Absolute 07/08/2022 0.08  0.00 - 0.40 10*3/mm3 Final    Basophils, Absolute 07/08/2022 0.04  0.00 - 0.20 10*3/mm3 Final    Immature Grans, Absolute 07/08/2022 0.01  0.00 - 0.05 10*3/mm3 Final    nRBC 07/08/2022 0.0  0.0 - 0.2 /100 WBC Final    TSH 07/08/2022 1.940  0.270 - 4.200 uIU/mL Final    Total Cholesterol 07/08/2022 178  0 - 200 mg/dL Final    Triglycerides 07/08/2022 43  0 - 150 mg/dL Final    HDL Cholesterol 07/08/2022 67 (H)  40 - 60 mg/dL Final    LDL Cholesterol  07/08/2022 102 (H)  0 - 100 mg/dL Final    VLDL Cholesterol 07/08/2022 9  5 - 40 mg/dL Final    LDL/HDL Ratio 07/08/2022 1.53   Final    Iron 07/08/2022 26 (L)  37 - 145 mcg/dL Final    Color, UA 07/08/2022 Yellow  Yellow, Straw Final    Appearance, UA 07/08/2022  Cloudy (A)  Clear Final    pH, UA 07/08/2022 6.0  5.0 - 8.0 Final    Specific Gravity, UA 07/08/2022 1.024  1.005 - 1.030 Final    Glucose, UA 07/08/2022 Negative  Negative Final    Ketones, UA 07/08/2022 Negative  Negative Final    Bilirubin, UA 07/08/2022 Negative  Negative Final    Blood, UA 07/08/2022 Negative  Negative Final    Protein, UA 07/08/2022 Negative  Negative Final    Leuk Esterase, UA 07/08/2022 Negative  Negative Final    Nitrite, UA 07/08/2022 Negative  Negative Final    Urobilinogen, UA 07/08/2022 0.2 E.U./dL  0.2 - 1.0 E.U./dL Final    Hepatitis C Ab 07/08/2022 Non-Reactive  Non-Reactive Final         Assessment & Plan   Problems Addressed this Visit    None  Visit Diagnoses       Panic anxiety syndrome    -  Primary    Relevant Medications    citalopram (CeleXA) 20 MG tablet    propranolol (INDERAL) 10 MG tablet    Dysthymia  (Chronic)       Relevant Medications    citalopram (CeleXA) 20 MG tablet    lamoTRIgine (LaMICtal ODT) 50 MG tablet dispersible disintegrating tablet    Bipolar affective disorder, currently depressed, moderate        Relevant Medications    citalopram (CeleXA) 20 MG tablet    lamoTRIgine (LaMICtal ODT) 50 MG tablet dispersible disintegrating tablet    Sleep disturbance        Medication refill        Relevant Medications    citalopram (CeleXA) 20 MG tablet    propranolol (INDERAL) 10 MG tablet    lamoTRIgine (LaMICtal ODT) 50 MG tablet dispersible disintegrating tablet          Diagnoses         Codes Comments    Panic anxiety syndrome    -  Primary ICD-10-CM: F41.0  ICD-9-CM: 300.01     Dysthymia     ICD-10-CM: F34.1  ICD-9-CM: 300.4     Bipolar affective disorder, currently depressed, moderate     ICD-10-CM: F31.32  ICD-9-CM: 296.52     Sleep disturbance     ICD-10-CM: G47.9  ICD-9-CM: 780.50     Medication refill     ICD-10-CM: Z76.0  ICD-9-CM: V68.1             Visit Diagnoses:    ICD-10-CM ICD-9-CM   1. Panic anxiety syndrome  F41.0 300.01   2. Dysthymia  F34.1 300.4    3. Bipolar affective disorder, currently depressed, moderate  F31.32 296.52   4. Sleep disturbance  G47.9 780.50   5. Medication refill  Z76.0 V68.1       Lamictal form changed to extended release and refilled.  Does not need trazodone refilled at this point.  Celexa refilled.  Propranolol 10 mg daily as needed for panic/anxiety initiated due to current stressors. Patient is educated upon risks versus benefits as well as side effects and when to seek care in an emergency setting.  Patient verbalized understanding.  Follow up in 4 weeks or sooner if needed.      Discussed need marijuana use with current medications could exacerbate mental health symptoms, leading to further depression or psychosis. There is also some research to support psychotropics and mood stabilizers with marijuana use could reduce efficacy of medications. may increase side effects such as dizziness, drowsiness, confusion, and difficulty concentrating. Some people, especially the elderly, may also experience impairment in thinking, judgment, and motor coordination. Discussed with patient need to practice cessation, verbalized understanding.    GOALS:  Short Term Goals: Patient will be compliant with medication, and patient will have no significant medication related side effects.  Patient will be engaged in psychotherapy as indicated.  Patient will report subjective improvement of symptoms.  Long term goals: To stabilize mood and treat/improve subjective symptoms, the patient will stay out of the hospital, the patient will be at an optimal level of functioning, and the patient will take all medications as prescribed.  The patient/guardian verbalized understanding and agreement with goals that were mutually set.      TREATMENT PLAN: Continue supportive psychotherapy efforts and medications as indicated.  Pharmacological and Non-Pharmacological treatment options discussed during today's visit. Patient/Guardian acknowledged and verbally consented  with current treatment plan and was educated on the importance of compliance with treatment and follow-up appointments.      MEDICATION ISSUES:  Discussed medication options and treatment plan of prescribed medication as well as the risks, benefits, any black box warnings, and side effects including potential falls, possible impaired driving, and metabolic adversities among others. Patient is agreeable to call the office with any worsening of symptoms or onset of side effects, or if any concerns or questions arise.  The contact information for the office is made available to the patient. Patient is agreeable to call 911 or go to the nearest ER should they begin having any SI/HI, or if any urgent concerns arise. No medication side effects or related complaints today.     MEDS ORDERED DURING VISIT:  New Medications Ordered This Visit   Medications    citalopram (CeleXA) 20 MG tablet     Sig: Take 1 tablet by mouth Daily.     Dispense:  30 tablet     Refill:  0    propranolol (INDERAL) 10 MG tablet     Sig: Take 1 tablet by mouth Daily As Needed (Anxiety).     Dispense:  30 tablet     Refill:  0    lamoTRIgine (LaMICtal ODT) 50 MG tablet dispersible disintegrating tablet     Sig: Place 1 tablet on the tongue Daily.     Dispense:  30 tablet     Refill:  0       Follow Up Appointment:   Return in about 4 weeks (around 4/2/2024) for Recheck.             This document has been electronically signed by RIVER Casper  March 5, 2024 11:31 EST    Some of the data in this electronic note has been brought forward from a previous encounter, any necessary changes have been made, it has been reviewed by this APRN, and it is accurate.      Dictated Utilizing Dragon Dictation: Part of this note may be an electronic transcription/translation of spoken language to printed text using the Dragon Dictation System.

## 2024-04-21 DIAGNOSIS — G47.9 SLEEP DISTURBANCE: ICD-10-CM

## 2024-04-21 DIAGNOSIS — F41.0 PANIC ANXIETY SYNDROME: ICD-10-CM

## 2024-04-21 DIAGNOSIS — F34.1 DYSTHYMIA: Chronic | ICD-10-CM

## 2024-04-21 DIAGNOSIS — F31.32 BIPOLAR AFFECTIVE DISORDER, CURRENTLY DEPRESSED, MODERATE: ICD-10-CM

## 2024-04-21 DIAGNOSIS — Z76.0 MEDICATION REFILL: ICD-10-CM

## 2024-04-22 RX ORDER — CITALOPRAM 20 MG/1
20 TABLET ORAL DAILY
Qty: 14 TABLET | Refills: 0 | Status: SHIPPED | OUTPATIENT
Start: 2024-04-22

## 2024-04-22 RX ORDER — LAMOTRIGINE 50 MG/1
50 TABLET, ORALLY DISINTEGRATING ORAL DAILY
Qty: 28 TABLET | Refills: 0 | Status: SHIPPED | OUTPATIENT
Start: 2024-04-22

## 2024-04-22 RX ORDER — TRAZODONE HYDROCHLORIDE 50 MG/1
25-50 TABLET ORAL NIGHTLY PRN
Qty: 15 TABLET | Refills: 0 | Status: SHIPPED | OUTPATIENT
Start: 2024-04-22

## 2024-04-22 RX ORDER — PROPRANOLOL HYDROCHLORIDE 10 MG/1
10 TABLET ORAL DAILY PRN
Qty: 15 TABLET | Refills: 0 | Status: SHIPPED | OUTPATIENT
Start: 2024-04-22

## 2024-06-17 ENCOUNTER — TELEMEDICINE (OUTPATIENT)
Dept: PSYCHIATRY | Facility: CLINIC | Age: 24
End: 2024-06-17
Payer: COMMERCIAL

## 2024-06-17 DIAGNOSIS — F41.0 PANIC ANXIETY SYNDROME: ICD-10-CM

## 2024-06-17 DIAGNOSIS — F34.1 DYSTHYMIA: Chronic | ICD-10-CM

## 2024-06-17 DIAGNOSIS — Z76.0 MEDICATION REFILL: ICD-10-CM

## 2024-06-17 DIAGNOSIS — F31.32 BIPOLAR AFFECTIVE DISORDER, CURRENTLY DEPRESSED, MODERATE: Primary | ICD-10-CM

## 2024-06-17 DIAGNOSIS — G47.9 SLEEP DISTURBANCE: ICD-10-CM

## 2024-06-17 PROCEDURE — 99214 OFFICE O/P EST MOD 30 MIN: CPT

## 2024-06-17 RX ORDER — HYDROCODONE BITARTRATE AND ACETAMINOPHEN 5; 325 MG/1; MG/1
TABLET ORAL
COMMUNITY
Start: 2024-05-14

## 2024-06-17 RX ORDER — PROPRANOLOL HYDROCHLORIDE 10 MG/1
10 TABLET ORAL DAILY PRN
Qty: 30 TABLET | Refills: 0 | Status: SHIPPED | OUTPATIENT
Start: 2024-06-17

## 2024-06-17 RX ORDER — CITALOPRAM 20 MG/1
20 TABLET ORAL DAILY
Qty: 30 TABLET | Refills: 0 | Status: SHIPPED | OUTPATIENT
Start: 2024-06-17

## 2024-06-17 RX ORDER — LAMOTRIGINE 100 MG/1
TABLET ORAL
Qty: 42 TABLET | Refills: 0 | Status: SHIPPED | OUTPATIENT
Start: 2024-06-17

## 2024-06-17 RX ORDER — ONDANSETRON 4 MG/1
TABLET, ORALLY DISINTEGRATING ORAL
COMMUNITY
Start: 2024-04-15
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
## 2024-08-28 ENCOUNTER — TELEPHONE (OUTPATIENT)
Dept: PSYCHIATRY | Facility: CLINIC | Age: 24
End: 2024-08-28
Payer: COMMERCIAL

## 2024-08-28 DIAGNOSIS — F31.32 BIPOLAR AFFECTIVE DISORDER, CURRENTLY DEPRESSED, MODERATE: ICD-10-CM

## 2024-08-28 DIAGNOSIS — F34.1 DYSTHYMIA: Chronic | ICD-10-CM

## 2024-08-28 DIAGNOSIS — F41.0 PANIC ANXIETY SYNDROME: ICD-10-CM

## 2024-08-28 DIAGNOSIS — Z76.0 MEDICATION REFILL: ICD-10-CM

## 2024-08-28 RX ORDER — CITALOPRAM HYDROBROMIDE 20 MG/1
20 TABLET ORAL DAILY
Qty: 30 TABLET | Refills: 0 | Status: SHIPPED | OUTPATIENT
Start: 2024-08-28

## 2024-08-28 RX ORDER — LAMOTRIGINE 100 MG/1
TABLET ORAL
Qty: 42 TABLET | Refills: 0 | Status: SHIPPED | OUTPATIENT
Start: 2024-08-28

## 2024-08-28 RX ORDER — PROPRANOLOL HYDROCHLORIDE 10 MG/1
10 TABLET ORAL DAILY PRN
Qty: 30 TABLET | Refills: 0 | Status: SHIPPED | OUTPATIENT
Start: 2024-08-28

## 2024-08-28 NOTE — TELEPHONE ENCOUNTER
Patient is asking if the medication celexa which she is currently taking could cause weight gain and/or increase appetite.   Please advise

## 2024-08-28 NOTE — TELEPHONE ENCOUNTER
Left the patient a detailed vm of what provider responded to on the last message. Explained on the vm if that did not answer all the patients question to please call the office back.

## 2024-08-28 NOTE — TELEPHONE ENCOUNTER
Patient has started lamictal and has now titrated to 1 tab twice daily.Pt is wanting to know does this medication cause weight gain and increase appetite.

## 2024-09-24 ENCOUNTER — TELEMEDICINE (OUTPATIENT)
Dept: PSYCHIATRY | Facility: CLINIC | Age: 24
End: 2024-09-24
Payer: COMMERCIAL

## 2024-09-24 DIAGNOSIS — Z76.0 MEDICATION REFILL: ICD-10-CM

## 2024-09-24 DIAGNOSIS — F31.32 BIPOLAR AFFECTIVE DISORDER, CURRENTLY DEPRESSED, MODERATE: ICD-10-CM

## 2024-09-24 DIAGNOSIS — F34.1 DYSTHYMIA: Chronic | ICD-10-CM

## 2024-09-24 DIAGNOSIS — F41.0 PANIC ANXIETY SYNDROME: Primary | ICD-10-CM

## 2024-09-24 PROCEDURE — 99214 OFFICE O/P EST MOD 30 MIN: CPT

## 2024-09-24 PROCEDURE — 96127 BRIEF EMOTIONAL/BEHAV ASSMT: CPT

## 2024-09-24 RX ORDER — CITALOPRAM HYDROBROMIDE 20 MG/1
20 TABLET ORAL DAILY
Qty: 30 TABLET | Refills: 0 | Status: SHIPPED | OUTPATIENT
Start: 2024-09-24

## 2024-09-24 RX ORDER — LAMOTRIGINE 100 MG/1
100 TABLET ORAL DAILY
Qty: 30 TABLET | Refills: 0 | Status: SHIPPED | OUTPATIENT
Start: 2024-09-24

## 2024-10-01 DIAGNOSIS — F34.1 DYSTHYMIA: Chronic | ICD-10-CM

## 2024-10-01 DIAGNOSIS — Z76.0 MEDICATION REFILL: ICD-10-CM

## 2024-10-01 DIAGNOSIS — F41.0 PANIC ANXIETY SYNDROME: ICD-10-CM

## 2024-10-01 RX ORDER — CITALOPRAM HYDROBROMIDE 20 MG/1
20 TABLET ORAL DAILY
Qty: 30 TABLET | Refills: 0 | Status: SHIPPED | OUTPATIENT
Start: 2024-10-01

## 2024-10-01 RX ORDER — PROPRANOLOL HCL 10 MG
10 TABLET ORAL DAILY PRN
Qty: 30 TABLET | Refills: 0 | Status: SHIPPED | OUTPATIENT
Start: 2024-10-01

## 2024-10-29 ENCOUNTER — TELEMEDICINE (OUTPATIENT)
Dept: PSYCHIATRY | Facility: CLINIC | Age: 24
End: 2024-10-29
Payer: COMMERCIAL

## 2024-10-29 DIAGNOSIS — G47.9 SLEEP DISTURBANCE: ICD-10-CM

## 2024-10-29 DIAGNOSIS — F41.0 PANIC ANXIETY SYNDROME: ICD-10-CM

## 2024-10-29 DIAGNOSIS — Z76.0 MEDICATION REFILL: ICD-10-CM

## 2024-10-29 DIAGNOSIS — F34.1 DYSTHYMIA: Chronic | ICD-10-CM

## 2024-10-29 DIAGNOSIS — F31.32 BIPOLAR AFFECTIVE DISORDER, CURRENTLY DEPRESSED, MODERATE: Primary | ICD-10-CM

## 2024-10-29 DIAGNOSIS — F31.32 BIPOLAR AFFECTIVE DISORDER, CURRENTLY DEPRESSED, MODERATE: ICD-10-CM

## 2024-10-29 RX ORDER — LAMOTRIGINE 100 MG/1
100 TABLET, EXTENDED RELEASE ORAL DAILY
Qty: 30 TABLET | Refills: 0 | Status: SHIPPED | OUTPATIENT
Start: 2024-10-29

## 2024-10-29 RX ORDER — LAMOTRIGINE 100 MG/1
100 TABLET ORAL DAILY
Qty: 30 TABLET | Refills: 0 | OUTPATIENT
Start: 2024-10-29

## 2024-10-29 RX ORDER — CITALOPRAM HYDROBROMIDE 20 MG/1
20 TABLET ORAL DAILY
Qty: 30 TABLET | Refills: 0 | Status: SHIPPED | OUTPATIENT
Start: 2024-10-29

## 2024-10-29 RX ORDER — CITALOPRAM HYDROBROMIDE 20 MG/1
20 TABLET ORAL DAILY
Qty: 30 TABLET | Refills: 0 | OUTPATIENT
Start: 2024-10-29

## 2024-10-29 NOTE — PROGRESS NOTES
"  This provider is located at the [facility] (through [facility]), [address], [facility], [address] using a secure Re-APPhart Video Visit through [employer]. Patient is being seen remotely via telehealth at their home address in [place], and stated they are in a secure environment for this session. The patient's condition being diagnosed/treated is appropriate for telemedicine. The provider identified herself as well as her credentials.   The patient, and/or patients guardian, consent to be seen remotely, and when consent is given they understand that the consent allows for patient identifiable information to be sent to a third party as needed.   They may refuse to be seen remotely at any time. The electronic data is encrypted and password protected, and the patient and/or guardian has been advised of the potential risks to privacy not withstanding such measures.    You have chosen to receive care through a telehealth visit.  Do you consent to use a video/audio connection for your medical care today? Yes    Patient identifiers utilized: Name and date of birth.    Patient verbally confirmed consent for today's encounter:  October 29, 2024    Levi Brown is a 23 y.o. female who presents today for follow up    Chief Complaint:    Chief Complaint   Patient presents with    Anxiety    Depression    Irritable    Med Management        Referring Provider: Jayme Hennessy LCSW    History of Present Illness:    History of Present Illness  Patient is a 23-year-old female presenting for a follow-up related to dysthymia, irritability, depression and anxiety as well as sleep disturbances.  She is tolerating medications well, voices she is mostly compliant, denies side effects.  PHQ increased in 2-4, still minimal for depression which patient rates a 1/10 on average.  Unfortunately she has her third circumstances of \"I do not want to get up, I just want to sleep.\"  Regarding " "her mood \"it has been good, I have had a few crying days.  I have a short fuse in the mornings.  I have just been on edge a little more lately in the mornings.\"  She indicates this is tolerable.  ISIDRO increase of 0-4, indicating minimal anxiety which patient rates a 4/10 on average \"it is a lot of stuff, not anxiety just more nervous.\"  Patient states she is planning to have a series conversation with her parents which is contributing to the symptoms currently.  She states \"I have been obsessively thinking about it.\"  Sleep is adequate \"good 7 hours.\"  Has not needed to utilize trazodone, states 25 mg causes some daytime grogginess.  Also has not recently needed propranolol, denies recent panic episodes \"I have not needed it recently.\"  She denies SI, HI, SIB, or hallucinations currently and is convincing.  Denies medical status changes.    Patient resides now back at home with her parents.  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  Pursuing other options after taking a break from school for a year.  She plays basketball, tournament starts next week.  Cites her parents remain together, relationship with them is improving.  Good relationships with her brother and her sister.  She is single, no children.  Does have a significant other.  Denies previous arrests.  [employer] preference.  Enjoys playing basketball and playing Xbox.  States frequent marijuana use.  He is working to reschedule therapy appointment.       Last Menstrual Period:  Beginning of this month    The patient denies any chance of pregnancy at this time.  The patient was educated that her prescribed medications can have potential risk to a developing fetus. The patient is advised to contact this APRN/this office if she becomes pregnant or plans to become pregnant.  Pt verbalizes understanding and acknowledged agreement with this plan in her own words.      The following portions of the patient's " history were reviewed and updated as appropriate: allergies, current medications, past family history, past medical history, past social history, past surgical history and problem list.    Past Psychiatric History:  Began Treatment: sophomore year  Diagnoses:Anxiety  Psychiatrist:[facility]on  Therapist: once a month  Admission History:Denies  Medication Trials: zoloft (I didn't eat on those and gained weight), seroquel (groggy), atarax (vomiting), trazodone (25 makes drowsy)  Self Harm: Denies  Suicide Attempts:Denies   Psychosis, Anxiety, Depression: Denies    Past Medical History:  Past Medical History:   Diagnosis Date    Anxiety     Depression     Insomnia        Substance Abuse History:   Types:Denies all, including illicit  Withdrawal Symptoms:Denies  Longest Period Sober:Not Applicable   AA: Not applicable     Social History:  Social History     Socioeconomic History    Marital status: Single    Number of children: 0   Tobacco Use    Smoking status: Never    Smokeless tobacco: Never   Vaping Use    Vaping status: Every Day    Substances: Nicotine    Devices: Disposable   Substance and Sexual Activity    Alcohol use: Yes     Alcohol/week: 2.0 standard drinks of alcohol     Types: 2 Shots of liquor per week     Comment: SOC    Drug use: Never    Sexual activity: Yes     Partners: Female     Birth control/protection: Partner of same sex       Family History:  Family History   Problem Relation Age of Onset    Hypertension Mother     Hyperlipidemia Mother     Mental illness Brother         BIPOLAR    Bipolar disorder Maternal Aunt     Heart attack Maternal Uncle     Cancer Maternal Grandmother         STOMACH       Past Surgical History:  Past Surgical History:   Procedure Laterality Date    MOUTH SURGERY      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 30 tablet 0    lamoTRIgine ER (LaMICtal XR) 100 MG tablet sustained-release 24 hour Take 1 tablet by mouth Daily. 30 tablet 0    propranolol (INDERAL) 10 MG tablet TAKE 1 TABLET BY MOUTH DAILY AS NEEDED FOR ANXIETY 30 tablet 0     No current facility-administered medications for this visit.       Review of Systems:    Review of Systems   Constitutional: Negative.    HENT: Negative.     Eyes: Negative.    Respiratory: Negative.     Cardiovascular: Negative.    Gastrointestinal: Negative.    Endocrine: Negative.    Genitourinary: Negative.    Musculoskeletal:  Positive for back pain.   Skin: Negative.    Allergic/Immunologic: Negative.    Neurological: Negative.  Negative for seizures.   Hematological: Negative.    Psychiatric/Behavioral:  Positive for dysphoric mood and stress. The patient is nervous/anxious.          Physical Exam:   Physical Exam  Constitutional:       Appearance: Normal appearance.   HENT:      Head: Normocephalic.      Nose: Nose normal.   Pulmonary:      Effort: Pulmonary effort is normal.   Musculoskeletal:         General: Normal range of motion.      Cervical back: Normal range of motion.   Neurological:      Mental Status: She is alert.   Psychiatric:         Attention and Perception: Attention and perception normal.         Mood and Affect: Affect normal. Mood is anxious. Affect is flat.         Speech: Speech normal.         Behavior: Behavior normal. Behavior is cooperative.         Thought Content: Thought content normal.         Cognition and Memory: Cognition and memory normal.         Judgment: Judgment is impulsive.         Vitals:  not currently breastfeeding. There is no height or weight on file to calculate BMI.  Due to extenuating circumstances and possible current health risks associated with the patient being present in a  clinical setting (with current health restrictions in place in regards to possible COVID 19 transmission/exposure), the patient was seen remotely today via a MyChart Video Visit through [facility].  Unable to obtain vital signs due to nature of remote visit.  Height stated at 5ft4.5 inches.  Weight stated at 160 pounds.    Last 3 Blood Pressure Readings:  BP Readings from Last 3 Encounters:   07/28/23 117/70   10/25/22 122/86   08/19/22 102/68       PHQ-9 Score:   PHQ-9 Total Score:  PHQ-9 Depression Screening  Little interest or pleasure in doing things?     Feeling down, depressed, or hopeless?     Trouble falling or staying asleep, or sleeping too much?     Feeling tired or having little energy?     Poor appetite or overeating?     Feeling bad about yourself - or that you are a failure or have let yourself or your family down?     Trouble concentrating on things, such as reading the newspaper or watching television?     Moving or speaking so slowly that other people could have noticed? Or the opposite - being so fidgety or restless that you have been moving around a lot more than usual?     Thoughts that you would be better off dead, or of hurting yourself in some way?     PHQ-9 Total Score     If you checked off any problems, how difficult have these problems made it for you to do your work, take care of things at home, or get along with other people?           ISIDRO-7 Score:   Feeling nervous, anxious or on edge: (Patient-Rptd) Several days  Not being able to stop or control worrying: (Patient-Rptd) Several days  Worrying too much about different things: (Patient-Rptd) More than half the days  Trouble Relaxing: (Patient-Rptd) Not at all  Being so restless that it is hard to sit still: (Patient-Rptd) Not at all  Feeling afraid as if something awful might happen: (Patient-Rptd) Not at all  Becoming easily annoyed or irritable: (Patient-Rptd) Not at all  ISIDRO 7 Total Score: (Patient-Rptd) 4  If you checked any problems,  how difficult have these problems made it for you to do your work, take care of things at home, or get along with other people: (Patient-Rptd) Somewhat difficult     Mental Status Exam:   Hygiene:   good  Cooperation:  Cooperative  Eye Contact:  Good  Psychomotor Behavior:  Appropriate  Affect:  Full range  Mood: normal  Hopelessness: 1  Speech:  Normal  Thought Process:  Goal directed and Linear  Thought Content:  Normal  Suicidal:  None  Homicidal:  None  Hallucinations:  None  Delusion:  None  Memory:  Intact  Orientation:  Grossly intact  Reliability:  good  Insight:  Fair  Judgement:  Impaired  Impulse Control:  Impaired  Physical/Medical Issues:  No        Lab Results:   No visits with results within 6 Month(s) from this visit.   Latest known visit with results is:   Office Visit on 07/08/2022   Component Date Value Ref Range Status    Glucose 07/08/2022 76  65 - 99 mg/dL Final    BUN 07/08/2022 8  6 - 20 mg/dL Final    Creatinine 07/08/2022 0.71  0.57 - 1.00 mg/dL Final    Sodium 07/08/2022 137  136 - 145 mmol/L Final    Potassium 07/08/2022 4.1  3.5 - 5.2 mmol/L Final    Chloride 07/08/2022 103  98 - 107 mmol/L Final    CO2 07/08/2022 24.6  22.0 - 29.0 mmol/L Final    Calcium 07/08/2022 9.6  8.6 - 10.5 mg/dL Final    Total Protein 07/08/2022 7.6  6.0 - 8.5 g/dL Final    Albumin 07/08/2022 4.80  3.50 - 5.20 g/dL Final    ALT (SGPT) 07/08/2022 24  1 - 33 U/L Final    AST (SGOT) 07/08/2022 45 (H)  1 - 32 U/L Final    Alkaline Phosphatase 07/08/2022 52  39 - 117 U/L Final    Total Bilirubin 07/08/2022 0.3  0.0 - 1.2 mg/dL Final    Globulin 07/08/2022 2.8  gm/dL Final    A/G Ratio 07/08/2022 1.7  g/dL Final    BUN/Creatinine Ratio 07/08/2022 11.3  7.0 - 25.0 Final    Anion Gap 07/08/2022 9.4  5.0 - 15.0 mmol/L Final    eGFR 07/08/2022 125.0  >60.0 mL/min/1.73 Final    National Kidney Foundation and American Society of Nephrology (ASN) Task Force recommended calculation based on the Chronic Kidney Disease  Epidemiology Collaboration (CKD-EPI) equation refit without adjustment for race.    WBC 07/08/2022 5.08  3.40 - 10.80 10*3/mm3 Final    RBC 07/08/2022 4.55  3.77 - 5.28 10*6/mm3 Final    Hemoglobin 07/08/2022 12.8  12.0 - 15.9 g/dL Final    Hematocrit 07/08/2022 38.4  34.0 - 46.6 % Final    MCV 07/08/2022 84.4  79.0 - 97.0 fL Final    MCH 07/08/2022 28.1  26.6 - 33.0 pg Final    MCHC 07/08/2022 33.3  31.5 - 35.7 g/dL Final    RDW 07/08/2022 15.1  12.3 - 15.4 % Final    RDW-SD 07/08/2022 45.3  37.0 - 54.0 fl Final    MPV 07/08/2022 9.6  6.0 - 12.0 fL Final    Platelets 07/08/2022 291  140 - 450 10*3/mm3 Final    Neutrophil % 07/08/2022 48.6  42.7 - 76.0 % Final    Lymphocyte % 07/08/2022 39.2  19.6 - 45.3 % Final    Monocyte % 07/08/2022 9.6  5.0 - 12.0 % Final    Eosinophil % 07/08/2022 1.6  0.3 - 6.2 % Final    Basophil % 07/08/2022 0.8  0.0 - 1.5 % Final    Immature Grans % 07/08/2022 0.2  0.0 - 0.5 % Final    Neutrophils, Absolute 07/08/2022 2.47  1.70 - 7.00 10*3/mm3 Final    Lymphocytes, Absolute 07/08/2022 1.99  0.70 - 3.10 10*3/mm3 Final    Monocytes, Absolute 07/08/2022 0.49  0.10 - 0.90 10*3/mm3 Final    Eosinophils, Absolute 07/08/2022 0.08  0.00 - 0.40 10*3/mm3 Final    Basophils, Absolute 07/08/2022 0.04  0.00 - 0.20 10*3/mm3 Final    Immature Grans, Absolute 07/08/2022 0.01  0.00 - 0.05 10*3/mm3 Final    nRBC 07/08/2022 0.0  0.0 - 0.2 /100 WBC Final    TSH 07/08/2022 1.940  0.270 - 4.200 uIU/mL Final    Total Cholesterol 07/08/2022 178  0 - 200 mg/dL Final    Triglycerides 07/08/2022 43  0 - 150 mg/dL Final    HDL Cholesterol 07/08/2022 67 (H)  40 - 60 mg/dL Final    LDL Cholesterol  07/08/2022 102 (H)  0 - 100 mg/dL Final    VLDL Cholesterol 07/08/2022 9  5 - 40 mg/dL Final    LDL/HDL Ratio 07/08/2022 1.53   Final    Iron 07/08/2022 26 (L)  37 - 145 mcg/dL Final    Color, UA 07/08/2022 Yellow  Yellow, Straw Final    Appearance, UA 07/08/2022 Cloudy (A)  Clear Final    pH, UA 07/08/2022 6.0  5.0 - 8.0  Final    Specific [place], UA 07/08/2022 1.024  1.005 - 1.030 Final    Glucose, UA 07/08/2022 Negative  Negative Final    Ketones, UA 07/08/2022 Negative  Negative Final    Bilirubin, UA 07/08/2022 Negative  Negative Final    Blood, UA 07/08/2022 Negative  Negative Final    Protein, UA 07/08/2022 Negative  Negative Final    Leuk Esterase, UA 07/08/2022 Negative  Negative Final    Nitrite, UA 07/08/2022 Negative  Negative Final    Urobilinogen, UA 07/08/2022 0.2 E.U./dL  0.2 - 1.0 E.U./dL Final    Hepatitis C Ab 07/08/2022 Non-Reactive  Non-Reactive Final         Assessment & Plan   Problems Addressed this Visit    None  Visit Diagnoses       Bipolar affective disorder, currently depressed, moderate    -  Primary    Relevant Medications    citalopram (CeleXA) 20 MG tablet    lamoTRIgine ER (LaMICtal XR) 100 MG tablet sustained-release 24 hour    Panic anxiety syndrome        Relevant Medications    citalopram (CeleXA) 20 MG tablet    Dysthymia  (Chronic)       Relevant Medications    citalopram (CeleXA) 20 MG tablet    Sleep disturbance        Medication refill        Relevant Medications    citalopram (CeleXA) 20 MG tablet    lamoTRIgine ER (LaMICtal XR) 100 MG tablet sustained-release 24 hour          Diagnoses         Codes Comments    Bipolar affective disorder, currently depressed, moderate    -  Primary ICD-10-CM: F31.32  ICD-9-CM: 296.52     Panic anxiety syndrome     ICD-10-CM: F41.0  ICD-9-CM: 300.01     Dysthymia     ICD-10-CM: F34.1  ICD-9-CM: 300.4     Sleep disturbance     ICD-10-CM: G47.9  ICD-9-CM: 780.50     Medication refill     ICD-10-CM: Z76.0  ICD-9-CM: V68.1             Visit Diagnoses:    ICD-10-CM ICD-9-CM   1. Bipolar affective disorder, currently depressed, moderate  F31.32 296.52   2. Panic anxiety syndrome  F41.0 300.01   3. Dysthymia  F34.1 300.4   4. Sleep disturbance  G47.9 780.50   5. Medication refill  Z76.0 V68.1       Lamictal form changed to extended release and refilled at this  time.  Refills also provided of Celexa.  Trazodone discontinued due to side effects.  Continue propranolol as needed, does not need refilled. Previously educated upon side effects with use of these medications as well as when to present for emergency services, denies at this time.  Therapy encouraged.  Supportive therapy provided today.  Follow up this provider in 4 weeks or sooner if needed.    Discussed need marijuana use with current medications could exacerbate mental health symptoms, leading to further depression or psychosis. There is also some research to support psychotropics and mood stabilizers with marijuana use could reduce efficacy of medications. may increase side effects such as dizziness, drowsiness, confusion, and difficulty concentrating. Some people, especially the elderly, may also experience impairment in thinking, judgment, and motor coordination. Discussed with patient need to practice cessation, verbalized understanding.    GOALS:  Short Term Goals: Patient will be compliant with medication, and patient will have no significant medication related side effects.  Patient will be engaged in psychotherapy as indicated.  Patient will report subjective improvement of symptoms.  Long term goals: To stabilize mood and treat/improve subjective symptoms, the patient will stay out of the hospital, the patient will be at an optimal level of functioning, and the patient will take all medications as prescribed.  The patient/guardian verbalized understanding and agreement with goals that were mutually set.      TREATMENT PLAN: Continue supportive psychotherapy efforts and medications as indicated.  Pharmacological and Non-Pharmacological treatment options discussed during today's visit. Patient/Guardian acknowledged and verbally consented with current treatment plan and was educated on the importance of compliance with treatment and follow-up appointments.      MEDICATION ISSUES:  Discussed medication  options and treatment plan of prescribed medication as well as the risks, benefits, any black box warnings, and side effects including potential falls, possible impaired driving, and metabolic adversities among others. Patient is agreeable to call the office with any worsening of symptoms or onset of side effects, or if any concerns or questions arise.  The contact information for the office is made available to the patient. Patient is agreeable to call 911 or go to the nearest ER should they begin having any SI/HI, or if any urgent concerns arise. No medication side effects or related complaints today.     MEDS ORDERED DURING VISIT:  New Medications Ordered This Visit   Medications    citalopram (CeleXA) 20 MG tablet     Sig: Take 1 tablet by mouth Daily.     Dispense:  30 tablet     Refill:  0    lamoTRIgine ER (LaMICtal XR) 100 MG tablet sustained-release 24 hour     Sig: Take 1 tablet by mouth Daily.     Dispense:  30 tablet     Refill:  0       Follow Up Appointment:   Return in about 4 weeks (around 11/26/2024) for Recheck.             This document has been electronically signed by RIVER Casper  October 29, 2024 14:01 EDT    Some of the data in this electronic note has been brought forward from a previous encounter, any necessary changes have been made, it has been reviewed by this APRN, and it is accurate.      Dictated Utilizing Dragon Dictation: Part of this note may be an electronic transcription/translation of spoken language to printed text using the Dragon Dictation System.

## 2024-11-14 ENCOUNTER — LAB (OUTPATIENT)
Dept: LAB | Facility: HOSPITAL | Age: 24
End: 2024-11-14
Payer: COMMERCIAL

## 2024-11-14 ENCOUNTER — OFFICE VISIT (OUTPATIENT)
Dept: FAMILY MEDICINE CLINIC | Facility: CLINIC | Age: 24
End: 2024-11-14
Payer: COMMERCIAL

## 2024-11-14 VITALS
WEIGHT: 197.6 LBS | OXYGEN SATURATION: 99 % | SYSTOLIC BLOOD PRESSURE: 126 MMHG | TEMPERATURE: 98 F | RESPIRATION RATE: 19 BRPM | DIASTOLIC BLOOD PRESSURE: 86 MMHG | BODY MASS INDEX: 32.92 KG/M2 | HEIGHT: 65 IN | HEART RATE: 69 BPM

## 2024-11-14 DIAGNOSIS — E66.09 CLASS 1 OBESITY DUE TO EXCESS CALORIES WITHOUT SERIOUS COMORBIDITY WITH BODY MASS INDEX (BMI) OF 33.0 TO 33.9 IN ADULT: ICD-10-CM

## 2024-11-14 DIAGNOSIS — E66.811 CLASS 1 OBESITY DUE TO EXCESS CALORIES WITHOUT SERIOUS COMORBIDITY WITH BODY MASS INDEX (BMI) OF 33.0 TO 33.9 IN ADULT: ICD-10-CM

## 2024-11-14 DIAGNOSIS — E61.1 IRON DEFICIENCY: ICD-10-CM

## 2024-11-14 DIAGNOSIS — R53.83 FATIGUE, UNSPECIFIED TYPE: ICD-10-CM

## 2024-11-14 DIAGNOSIS — R63.5 WEIGHT GAIN: ICD-10-CM

## 2024-11-14 DIAGNOSIS — Z00.00 ENCOUNTER FOR MEDICAL EXAMINATION TO ESTABLISH CARE: Primary | ICD-10-CM

## 2024-11-14 DIAGNOSIS — F31.9 BIPOLAR AFFECTIVE DISORDER, REMISSION STATUS UNSPECIFIED: ICD-10-CM

## 2024-11-14 PROCEDURE — 84466 ASSAY OF TRANSFERRIN: CPT | Performed by: PHYSICIAN ASSISTANT

## 2024-11-14 PROCEDURE — 99214 OFFICE O/P EST MOD 30 MIN: CPT | Performed by: PHYSICIAN ASSISTANT

## 2024-11-14 PROCEDURE — 83540 ASSAY OF IRON: CPT | Performed by: PHYSICIAN ASSISTANT

## 2024-11-14 PROCEDURE — 80050 GENERAL HEALTH PANEL: CPT | Performed by: PHYSICIAN ASSISTANT

## 2024-11-14 PROCEDURE — 82607 VITAMIN B-12: CPT | Performed by: PHYSICIAN ASSISTANT

## 2024-11-14 PROCEDURE — 80061 LIPID PANEL: CPT | Performed by: PHYSICIAN ASSISTANT

## 2024-11-14 PROCEDURE — 82728 ASSAY OF FERRITIN: CPT | Performed by: PHYSICIAN ASSISTANT

## 2024-11-14 PROCEDURE — 82746 ASSAY OF FOLIC ACID SERUM: CPT | Performed by: PHYSICIAN ASSISTANT

## 2024-11-14 PROCEDURE — 36415 COLL VENOUS BLD VENIPUNCTURE: CPT | Performed by: PHYSICIAN ASSISTANT

## 2024-11-14 PROCEDURE — 84439 ASSAY OF FREE THYROXINE: CPT | Performed by: PHYSICIAN ASSISTANT

## 2024-11-14 PROCEDURE — 82306 VITAMIN D 25 HYDROXY: CPT | Performed by: PHYSICIAN ASSISTANT

## 2024-11-14 NOTE — PROGRESS NOTES
Follow Up Office Visit      Patient Name: Saba Brown  : 2000   MRN: [id number]     Chief Complaint:    Chief Complaint   Patient presents with    Weight Loss     History of Present Illness  The patient presents to establish care with new provider and to discuss weight loss options.    She reports overall good health but has not seen a doctor recently. She seeks advice on weight management and a referral for breast reduction. She has gained approximately 50 pounds since her freshman year of college, attributing this to hormonal changes and mood fluctuations. She has been exercising since May 2024, primarily using the [employer] due to discomfort from running caused by her breasts and back pain. She has been making efforts to reduce her portion sizes and increase her vegetable intake. She lives with her parents and does not cook or grocery shop. She occasionally drinks soda.    Her menstrual cycles are typically heavy, but she notes a decrease in intensity during her last cycle.    She experiences fatigue and has had sleep issues, although these have improved recently. She aims for 7-8 hours of sleep per night.    She has a history of marijuana use, which she has significantly reduced since she graduated college    She is under the care of Dr. Dinah Mercedes for mood disorder, which is currently stable. She was diagnosed with bipolar affective disorder in 2023. She has a history of anxiety and panic attacks, which she believes were stress-induced and are well-controlled at this time.    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 She graduated from college in 2024 with a degree in psychology.        Subjective      I have reviewed and the following portions of the patient's history were updated as appropriate: past family history, past medical history, past social history, past surgical history and problem  "list.    Medications:     Current Outpatient Medications:     citalopram (CeleXA) 20 MG tablet, Take 1 tablet by mouth Daily., Disp: 30 tablet, Rfl: 0    lamoTRIgine ER (LaMICtal XR) 100 MG tablet sustained-release 24 hour, Take 1 tablet by mouth Daily., Disp: 30 tablet, Rfl: 0    propranolol (INDERAL) 10 MG tablet, TAKE 1 TABLET BY MOUTH DAILY AS NEEDED FOR ANXIETY, Disp: 30 tablet, Rfl: 0    Allergies:   No Known Allergies    Objective     Physical Exam:   Physical Exam  Constitutional:       General: She is not in acute distress.     Appearance: She is obese. She is not ill-appearing.   HENT:      Head: Normocephalic.   Cardiovascular:      Rate and Rhythm: Normal rate and regular rhythm.      Heart sounds: No murmur heard.  Pulmonary:      Effort: Pulmonary effort is normal. No respiratory distress.      Breath sounds: Normal breath sounds.   Musculoskeletal:         General: Normal range of motion.   Skin:     General: Skin is warm.   Neurological:      General: No focal deficit present.      Mental Status: She is alert.   Psychiatric:         Mood and Affect: Mood normal.         Vital Signs:   Vitals:    11/14/24 1339   BP: 126/86   BP Location: Left arm   Patient Position: Sitting   Cuff Size: Adult   Pulse: 69   Resp: 19   Temp: 98 °F (36.7 °C)   TempSrc: Infrared   SpO2: 99%   Weight: 89.6 kg (197 lb 9.6 oz)   Height: 163.8 cm (64.5\")     Body mass index is 33.39 kg/m².  BMI is >= 30 and <35. (Class 1 Obesity). The following options were offered after discussion;: exercise counseling/recommendations and nutrition counseling/recommendations      Procedures    Assessment / Plan         Assessment and Plan     Diagnoses and all orders for this visit:    1. Encounter for medical examination to establish care (Primary)  -     Comprehensive Metabolic Panel  -     Lipid Panel  -     CBC & Differential  -     Vitamin B12  -     Folate    2. Weight gain  Assessment & Plan:  Patient up approximately 50 pounds since " her freshman year of college.  She states she gained approximately 20 to 25 pounds while in college.  States that she played basketball in college and the exercise helped keep her weight somewhat controlled.  States after graduating she gained approximately 20 or more additional pounds.  She recently started working out and has been working on portion sizes.  We had a lengthy discussion regarding diet and lifestyle modifications.  Recommended keeping a food journal or diary.  Discussed the importance of getting at least 100 to 120 g of protein daily.  Discussed the importance of limiting excess sugar/processed foods.  Discussed the need for caloric deficit in order to achieve weight loss.    Orders:  -     TSH  -     T4, Free    3. Fatigue, unspecified type  Assessment & Plan:  Improving with improved sleep patterns  Will rule out vitamin deficiency today      4. Iron deficiency  Assessment & Plan:  Labs x 2 years ago revealed mild iron deficiency  Will recheck today    Orders:  -     Iron Profile  -     Ferritin    5. Class 1 obesity due to excess calories without serious comorbidity with body mass index (BMI) of 33.0 to 33.9 in adult  -     Vitamin D,25-Hydroxy    6. Bipolar affective disorder  Assessment & Plan:  Followed closely by Dinah Lane.  Continue current regimen            Follow Up:   Return in about 8 weeks (around 1/9/2025) for weight check .    Patient or patient representative verbalized consent for the use of Ambient Listening during the visit with  Christianne Cleveland PA-C for chart documentation. 11/14/2024  13:45 EST    Christianne Cleveland PA-C    [facility] [place]

## 2024-11-14 NOTE — PATIENT INSTRUCTIONS
"Healthy Eating, Adult  Healthy eating may help you get and keep a healthy body weight, reduce the risk of chronic disease, and live a long and productive life. It is important to follow a healthy eating pattern. Your nutritional and calorie needs should be met mainly by different nutrient-rich foods.  What are tips for following this plan?  Reading food labels  Read labels and choose the following:  Reduced or low sodium products.  Juices with 100% fruit juice.  Foods with low saturated fats (<3 g per serving) and high polyunsaturated and monounsaturated fats.  Foods with whole grains, such as whole wheat, cracked wheat, brown rice, and wild rice.  Whole grains that are fortified with folic acid. This is recommended for females who are pregnant or who want to become pregnant.  Read labels and do not eat or drink the following:  Foods or drinks with added sugars. These include foods that contain brown sugar, corn sweetener, corn syrup, dextrose, fructose, glucose, high-fructose corn syrup, honey, invert sugar, lactose, malt syrup, maltose, molasses, raw sugar, sucrose, trehalose, or turbinado sugar.  Limit your intake of added sugars to less than 10% of your total daily calories. Do not eat more than the following amounts of added sugar per day:  6 teaspoons (25 g) for females.  9 teaspoons (38 g) for males.  Foods that contain processed or refined starches and grains.  Refined grain products, such as white flour, degermed cornmeal, white bread, and white rice.  Shopping  Choose nutrient-rich snacks, such as vegetables, whole fruits, and nuts. Avoid high-calorie and high-sugar snacks, such as potato chips, fruit snacks, and candy.  Use oil-based dressings and spreads on foods instead of solid fats such as butter, margarine, sour cream, or cream cheese.  Limit pre-made sauces, mixes, and \"instant\" products such as flavored rice, instant noodles, and ready-made pasta.  Try more plant-protein sources, such as tofu, " tempeh, black beans, edamame, lentils, nuts, and seeds.  Explore eating plans such as the Mediterranean diet or vegetarian diet.  Try heart-healthy dips made with beans and healthy fats like hummus and guacamole. Vegetables go great with these.  Cooking  Use oil to sauté or stir-hernandez foods instead of solid fats such as butter, margarine, or lard.  Try baking, boiling, grilling, or broiling instead of frying.  Remove the fatty part of meats before cooking.  Steam vegetables in water or broth.  Meal planning    At meals, imagine dividing your plate into fourths:  One-half of your plate is fruits and vegetables.  One-fourth of your plate is whole grains.  One-fourth of your plate is protein, especially lean meats, poultry, eggs, tofu, beans, or nuts.  Include low-fat dairy as part of your daily diet.  Lifestyle  Choose healthy options in all settings, including home, work, school, restaurants, or stores.  Prepare your food safely:  Wash your hands after handling raw meats.  Where you prepare food, keep surfaces clean by regularly washing with hot, soapy water.  Keep raw meats separate from ready-to-eat foods, such as fruits and vegetables.  , meat, poultry, and eggs to the recommended temperature. Get a food thermometer.  Store foods at safe temperatures. In general:  Keep cold foods at 40°F (4.4°C) or below.  Keep hot foods at 140°F (60°C) or above.  Keep your freezer at 0°F (-17.8°C) or below.  Foods are not safe to eat if they have been between the temperatures of °F (4.4-60°C) for more than 2 hours.  What foods should I eat?  Fruits  Aim to eat 1½-2½ cups of fresh, canned (in natural juice), or frozen fruits each day. One cup of fruit equals 1 small apple, 1 large banana, 8 large strawberries, 1 cup (237 g) canned fruit, ½ cup (82 g) dried fruit, or 1 cup (240 mL) 100% juice.  Vegetables  Aim to eat 2-4 cups of fresh and frozen vegetables each day, including different varieties and colors. One cup  of vegetables equals 1 cup (91 g) broccoli or cauliflower florets, 2 medium carrots, 2 cups (150 g) raw, leafy greens, 1 large tomato, 1 large tanner pepper, 1 large sweet potato, or 1 medium white potato.  Grains  Aim to eat 5-10 ounce-equivalents of whole grains each day. Examples of 1 ounce-equivalent of grains include 1 slice of bread, 1 cup (40 g) ready-to-eat cereal, 3 cups (24 g) popcorn, or ½ cup (93 g) cooked rice.  Meats and other proteins  Try to eat 5-7 ounce-equivalents of protein each day. Examples of 1 ounce-equivalent of protein include 1 egg, ½ oz nuts (12 almonds, 24 pistachios, or 7 walnut halves), 1/4 cup (90 g) cooked beans, 6 tablespoons (90 g) hummus or 1 tablespoon (16 g) peanut butter. A cut of meat or fish that is the size of a deck of cards is about 3-4 ounce-equivalents (85 g).  Of the protein you eat each week, try to have at least 8 sounce (227 g) of seafood. This is about 2 servings per week. This includes salmon, trout, herring, sardines, and anchovies.  Dairy  Aim to eat 3 cup-equivalents of fat-free or low-fat dairy each day. Examples of 1 cup-equivalent of dairy include 1 cup (240 mL) milk, 8 ounces (250 g) yogurt, 1½ ounces (44 g) natural cheese, or 1 cup (240 mL) fortified soy milk.  Fats and oils  Aim for about 5 teaspoons (21 g) of fats and oils per day. Choose monounsaturated fats, such as canola and olive oils, mayonnaise made with olive oil or avocado oil, avocados, peanut butter, and most nuts, or polyunsaturated fats, such as sunflower, corn, and soybean oils, walnuts, pine nuts, sesame seeds, sunflower seeds, and flaxseed.  Beverages  Aim for 6 eight-ounce glasses of water per day. Limit coffee to 3-5 eight-ounce cups per day.  Limit caffeinated beverages that have added calories, such as soda and energy drinks.  If you drink alcohol:  Limit how much you have to:  0-1 drink a day if you are female.  0-2 drinks a day if you are male.  Know how much alcohol is in your drink.  In the [place] one drink is one 12 oz bottle of beer (355 mL), one 5 oz glass of wine (148 mL), or one 1½ oz glass of hard liquor (44 mL).  Seasoning and other foods  Try not to add too much salt to your food. Try using herbs and spices instead of salt.  Try not to add sugar to food.  This information is based on [place] nutrition guidelines. To learn more, visit [employer].gov. Exact amounts may vary. You may need different amounts.  This information is not intended to replace advice given to you by your health care provider. Make sure you discuss any questions you have with your health care provider.  Document Revised: 09/18/2023 Document Reviewed: 09/18/2023  Elsevier Patient Education © 2024 [employer]

## 2024-11-14 NOTE — ASSESSMENT & PLAN NOTE
Patient up approximately 50 pounds since her freshman year of college.  She states she gained approximately 20 to 25 pounds while in college.  States that she played basketball in college and the exercise helped keep her weight somewhat controlled.  States after graduating she gained approximately 20 or more additional pounds.  She recently started working out and has been working on portion sizes.  We had a lengthy discussion regarding diet and lifestyle modifications.  Recommended keeping a food journal or diary.  Discussed the importance of getting at least 100 to 120 g of protein daily.  Discussed the importance of limiting excess sugar/processed foods.  Discussed the need for caloric deficit in order to achieve weight loss.

## 2024-11-15 LAB
25(OH)D3 SERPL-MCNC: 21.1 NG/ML (ref 30–100)
ALBUMIN SERPL-MCNC: 4.5 G/DL (ref 3.5–5.2)
ALBUMIN/GLOB SERPL: 1.4 G/DL
ALP SERPL-CCNC: 79 U/L (ref 39–117)
ALT SERPL W P-5'-P-CCNC: 72 U/L (ref 1–33)
ANION GAP SERPL CALCULATED.3IONS-SCNC: 14 MMOL/L (ref 5–15)
AST SERPL-CCNC: 53 U/L (ref 1–32)
BASOPHILS # BLD AUTO: 0.05 10*3/MM3 (ref 0–0.2)
BASOPHILS NFR BLD AUTO: 0.7 % (ref 0–1.5)
BILIRUB SERPL-MCNC: 0.3 MG/DL (ref 0–1.2)
BUN SERPL-MCNC: 11 MG/DL (ref 6–20)
BUN/CREAT SERPL: 16.9 (ref 7–25)
CALCIUM SPEC-SCNC: 9.5 MG/DL (ref 8.6–10.5)
CHLORIDE SERPL-SCNC: 98 MMOL/L (ref 98–107)
CHOLEST SERPL-MCNC: 176 MG/DL (ref 0–200)
CO2 SERPL-SCNC: 23 MMOL/L (ref 22–29)
CREAT SERPL-MCNC: 0.65 MG/DL (ref 0.57–1)
DEPRECATED RDW RBC AUTO: 49.4 FL (ref 37–54)
EGFRCR SERPLBLD CKD-EPI 2021: 127.8 ML/MIN/1.73
EOSINOPHIL # BLD AUTO: 0.29 10*3/MM3 (ref 0–0.4)
EOSINOPHIL NFR BLD AUTO: 3.9 % (ref 0.3–6.2)
ERYTHROCYTE [DISTWIDTH] IN BLOOD BY AUTOMATED COUNT: 15.2 % (ref 12.3–15.4)
FERRITIN SERPL-MCNC: 27.6 NG/ML (ref 13–150)
FOLATE SERPL-MCNC: 8.43 NG/ML (ref 4.78–24.2)
GLOBULIN UR ELPH-MCNC: 3.2 GM/DL
GLUCOSE SERPL-MCNC: 71 MG/DL (ref 65–99)
HCT VFR BLD AUTO: 42.1 % (ref 34–46.6)
HDLC SERPL-MCNC: 62 MG/DL (ref 40–60)
HGB BLD-MCNC: 12.9 G/DL (ref 12–15.9)
IMM GRANULOCYTES # BLD AUTO: 0.05 10*3/MM3 (ref 0–0.05)
IMM GRANULOCYTES NFR BLD AUTO: 0.7 % (ref 0–0.5)
IRON 24H UR-MRATE: 73 MCG/DL (ref 37–145)
IRON SATN MFR SERPL: 12 % (ref 20–50)
LDLC SERPL CALC-MCNC: 104 MG/DL (ref 0–100)
LDLC/HDLC SERPL: 1.67 {RATIO}
LYMPHOCYTES # BLD AUTO: 2.51 10*3/MM3 (ref 0.7–3.1)
LYMPHOCYTES NFR BLD AUTO: 34 % (ref 19.6–45.3)
MCH RBC QN AUTO: 27.2 PG (ref 26.6–33)
MCHC RBC AUTO-ENTMCNC: 30.6 G/DL (ref 31.5–35.7)
MCV RBC AUTO: 88.8 FL (ref 79–97)
MONOCYTES # BLD AUTO: 0.79 10*3/MM3 (ref 0.1–0.9)
MONOCYTES NFR BLD AUTO: 10.7 % (ref 5–12)
NEUTROPHILS NFR BLD AUTO: 3.69 10*3/MM3 (ref 1.7–7)
NEUTROPHILS NFR BLD AUTO: 50 % (ref 42.7–76)
NRBC BLD AUTO-RTO: 0 /100 WBC (ref 0–0.2)
PLATELET # BLD AUTO: 330 10*3/MM3 (ref 140–450)
PMV BLD AUTO: 9.9 FL (ref 6–12)
POTASSIUM SERPL-SCNC: 4.1 MMOL/L (ref 3.5–5.2)
PROT SERPL-MCNC: 7.7 G/DL (ref 6–8.5)
RBC # BLD AUTO: 4.74 10*6/MM3 (ref 3.77–5.28)
SODIUM SERPL-SCNC: 135 MMOL/L (ref 136–145)
T4 FREE SERPL-MCNC: 0.93 NG/DL (ref 0.92–1.68)
TIBC SERPL-MCNC: 609 MCG/DL (ref 298–536)
TRANSFERRIN SERPL-MCNC: 409 MG/DL (ref 200–360)
TRIGL SERPL-MCNC: 51 MG/DL (ref 0–150)
TSH SERPL DL<=0.05 MIU/L-ACNC: 2.55 UIU/ML (ref 0.27–4.2)
VIT B12 BLD-MCNC: 894 PG/ML (ref 211–946)
VLDLC SERPL-MCNC: 10 MG/DL (ref 5–40)
WBC NRBC COR # BLD AUTO: 7.38 10*3/MM3 (ref 3.4–10.8)

## 2024-11-15 RX ORDER — ERGOCALCIFEROL 1.25 MG/1
50000 CAPSULE, LIQUID FILLED ORAL WEEKLY
Qty: 12 CAPSULE | Refills: 1 | Status: SHIPPED | OUTPATIENT
Start: 2024-11-15

## 2024-11-21 DIAGNOSIS — N62 LARGE BREASTS: Primary | ICD-10-CM

## 2024-11-27 ENCOUNTER — TELEPHONE (OUTPATIENT)
Dept: FAMILY MEDICINE CLINIC | Facility: CLINIC | Age: 24
End: 2024-11-27
Payer: COMMERCIAL

## 2024-11-27 NOTE — TELEPHONE ENCOUNTER
Spoke with patient. Will discuss symptoms associated with large breasts at next follow up in further detail then will resend referral

## 2024-12-16 DIAGNOSIS — Z76.0 MEDICATION REFILL: ICD-10-CM

## 2024-12-16 DIAGNOSIS — F34.1 DYSTHYMIA: Chronic | ICD-10-CM

## 2024-12-16 DIAGNOSIS — F31.32 BIPOLAR AFFECTIVE DISORDER, CURRENTLY DEPRESSED, MODERATE: ICD-10-CM

## 2024-12-16 DIAGNOSIS — F41.0 PANIC ANXIETY SYNDROME: ICD-10-CM

## 2024-12-16 RX ORDER — LAMOTRIGINE 100 MG/1
100 TABLET, EXTENDED RELEASE ORAL DAILY
Qty: 30 TABLET | Refills: 0 | Status: SHIPPED | OUTPATIENT
Start: 2024-12-16

## 2024-12-16 RX ORDER — PROPRANOLOL HYDROCHLORIDE 10 MG/1
10 TABLET ORAL DAILY PRN
Qty: 30 TABLET | Refills: 0 | Status: SHIPPED | OUTPATIENT
Start: 2024-12-16

## 2024-12-16 RX ORDER — CITALOPRAM HYDROBROMIDE 20 MG/1
20 TABLET ORAL DAILY
Qty: 30 TABLET | Refills: 0 | Status: SHIPPED | OUTPATIENT
Start: 2024-12-16

## 2024-12-16 NOTE — TELEPHONE ENCOUNTER
Pt states she was having difficulty logging in to her appt. Pt states that Epic kept giving her  errors and it would not let her log on. Pt has been rescheduled but will need refills.

## 2024-12-16 NOTE — TELEPHONE ENCOUNTER
Tried both numbers on file to call the pt and both phone numbers say that the phones are no longer in service.  Sent pt a Meez message to make her aware her refills have been sent to the pharmacy.

## 2025-01-12 DIAGNOSIS — F34.1 DYSTHYMIA: Chronic | ICD-10-CM

## 2025-01-12 DIAGNOSIS — F41.0 PANIC ANXIETY SYNDROME: ICD-10-CM

## 2025-01-12 DIAGNOSIS — F31.32 BIPOLAR AFFECTIVE DISORDER, CURRENTLY DEPRESSED, MODERATE: ICD-10-CM

## 2025-01-12 DIAGNOSIS — Z76.0 MEDICATION REFILL: ICD-10-CM

## 2025-01-13 RX ORDER — PROPRANOLOL HYDROCHLORIDE 10 MG/1
10 TABLET ORAL DAILY PRN
Qty: 30 TABLET | Refills: 0 | Status: SHIPPED | OUTPATIENT
Start: 2025-01-13

## 2025-01-13 RX ORDER — LAMOTRIGINE 100 MG/1
100 TABLET, EXTENDED RELEASE ORAL DAILY
Qty: 30 TABLET | Refills: 0 | Status: SHIPPED | OUTPATIENT
Start: 2025-01-13

## 2025-01-13 RX ORDER — CITALOPRAM HYDROBROMIDE 20 MG/1
20 TABLET ORAL DAILY
Qty: 30 TABLET | Refills: 0 | Status: SHIPPED | OUTPATIENT
Start: 2025-01-13

## 2025-01-15 ENCOUNTER — OFFICE VISIT (OUTPATIENT)
Dept: FAMILY MEDICINE CLINIC | Facility: CLINIC | Age: 25
End: 2025-01-15
Payer: COMMERCIAL

## 2025-01-15 VITALS
WEIGHT: 203 LBS | BODY MASS INDEX: 34.66 KG/M2 | HEART RATE: 79 BPM | SYSTOLIC BLOOD PRESSURE: 114 MMHG | HEIGHT: 64 IN | RESPIRATION RATE: 20 BRPM | TEMPERATURE: 98.2 F | DIASTOLIC BLOOD PRESSURE: 72 MMHG | OXYGEN SATURATION: 100 %

## 2025-01-15 DIAGNOSIS — F31.9 BIPOLAR AFFECTIVE DISORDER, REMISSION STATUS UNSPECIFIED: Chronic | ICD-10-CM

## 2025-01-15 DIAGNOSIS — N62 LARGE BREASTS: ICD-10-CM

## 2025-01-15 DIAGNOSIS — R63.5 WEIGHT GAIN: Primary | ICD-10-CM

## 2025-01-15 DIAGNOSIS — M54.6 THORACOLUMBAR BACK PAIN: ICD-10-CM

## 2025-01-15 DIAGNOSIS — M54.50 THORACOLUMBAR BACK PAIN: ICD-10-CM

## 2025-01-15 PROCEDURE — 99214 OFFICE O/P EST MOD 30 MIN: CPT | Performed by: PHYSICIAN ASSISTANT

## 2025-01-15 NOTE — PROGRESS NOTES
Follow Up Office Visit      Patient Name: Saba Brown  : 2000   MRN: [id number]     Chief Complaint:    Chief Complaint   Patient presents with   • Weight Check     FU FOR WEIGHT GAIN       History of Present Illness  The patient presents for follow-up on weight management and to discuss breast reduction.    She reports an improvement in her overall health status. She has been abstaining from alcohol and tobacco use for the past week. She acknowledges a history of binge drinking and believes that her weight issues are interconnected with this habit. She anticipates that her cessation of alcohol and tobacco use will positively impact her weight and metabolism, which is most certainly reasonable.  She states she states during the holidays she had some difficulty managing her diet and exercise, though otherwise she has done quite well.  Feels her clothes are fitting far more comfortably.    She is seeking a referral for plastic surgery due to the physical discomfort caused by her large breasts, which exceed a double D cup size. She experiences both upper and lower back pain, difficulty in exercising, and discomfort from bra straps, which have left marks on her shoulders.   States she has developed poor posture leading to back pain and states her breast size has affected her self-esteem negatively.  She experiences daily back pain and finds running, jumping, and weightlifting challenging due to breast size.  Feels her range of motion is also limited when lifting weights.    She is scheduled to establish with an OB/GYN and for her first Pap smear in May 2025.    Her mood remains stable as long as she adheres to her medication regimen.        Subjective      I have reviewed and the following portions of the patient's history were updated as appropriate: past family history, past medical history, past social history, past surgical history and problem list.    Medications:     Current Outpatient Medications:  "  •  citalopram (CeleXA) 20 MG tablet, TAKE 1 TABLET BY MOUTH DAILY, Disp: 30 tablet, Rfl: 0  •  lamoTRIgine  MG tablet sustained-release 24 hour, TAKE 1 TABLET BY MOUTH DAILY, Disp: 30 tablet, Rfl: 0  •  propranolol (INDERAL) 10 MG tablet, TAKE 1 TABLET BY MOUTH DAILY AS NEEDED FOR ANXIETY, Disp: 30 tablet, Rfl: 0  •  vitamin D (ERGOCALCIFEROL) 1.25 MG (14006 UT) capsule capsule, Take 1 capsule by mouth 1 (One) Time Per Week., Disp: 12 capsule, Rfl: 1    Allergies:   No Known Allergies    Objective     Physical Exam:   Physical Exam  Constitutional:       General: She is not in acute distress.     Appearance: She is not ill-appearing.   HENT:      Head: Normocephalic.   Cardiovascular:      Rate and Rhythm: Normal rate and regular rhythm.      Heart sounds: No murmur heard.  Pulmonary:      Effort: Pulmonary effort is normal. No respiratory distress.      Breath sounds: Normal breath sounds.   Musculoskeletal:         General: Normal range of motion.   Skin:     General: Skin is warm.   Neurological:      General: No focal deficit present.      Mental Status: She is alert.   Psychiatric:         Mood and Affect: Mood normal.         Vital Signs:   Vitals:    01/15/25 1319   BP: 114/72   Pulse: 79   Resp: 20   Temp: 98.2 °F (36.8 °C)   TempSrc: Temporal   SpO2: 100%   Weight: 92.1 kg (203 lb)   Height: 163.8 cm (64.49\")     Body mass index is 34.32 kg/m².         Procedures    Assessment / Plan         Assessment and Plan     Diagnoses and all orders for this visit:    1. Weight gain (Primary)  Assessment & Plan:  Patient states she has been working on implementing the necessary diet and lifestyle modifications we have discussed  States while the scale does not suggest this, she has noticed that her clothes are beginning to fit better.  Of note, she does feel her exercise regimen is limited secondary to her large breast and discomfort with exercise.  Plans to discuss this further with plastic surgeon.  She " also has discontinued alcohol which will likely assist in her ability to lose weight  We will have close follow-up in approximately 3 months.    11/14/2024  Patient up approximately 50 pounds since her freshman year of college.  She states she gained approximately 20 to 25 pounds while in college.  States that she played basketball in college and the exercise helped keep her weight somewhat controlled.  States after graduating she gained approximately 20 or more additional pounds.  She recently started working out and has been working on portion sizes.  We had a lengthy discussion regarding diet and lifestyle modifications.  Recommended keeping a food journal or diary.  Discussed the importance of getting at least 100 to 120 g of protein daily.  Discussed the importance of limiting excess sugar/processed foods.  Discussed the need for caloric deficit in order to achieve weight loss.    Orders:  -     Ambulatory Referral to Plastic Surgery    2. Large breasts  Assessment & Plan:  Breast size exceeds double D in cup size.  Patient experiencing thoracolumbar back pain daily, has difficulty exercising including running, jumping, weight lifting secondary to discomfort  Range of motion limited when lifting weights.  She has developed poor posture and her self-esteem has been negatively affected  Patient would benefit greatly from breast reduction  Will send referral to plastic surgery for ongoing management      Orders:  -     Ambulatory Referral to Plastic Surgery    3. Thoracolumbar back pain  Assessment & Plan:  Breast size a contributing factor  Will move forward with supportive and symptomatic treatment  Referral to plastic surgery sent today    Orders:  -     Ambulatory Referral to Plastic Surgery    4. Bipolar affective disorder, remission status unspecified  Assessment & Plan:  Followed closely by Dinah Lane.  Continue current regimen                Follow Up:   Return in about 3 months (around  4/15/2025) for Annual physical, Fasting Labs.    Patient or patient representative verbalized consent for the use of Ambient Listening during the visit with  Christianne Cleveland PA-C for chart documentation. 1/15/2025  13:23 EST    Christianne Cleveland PA-C    [facility] [place]

## 2025-01-15 NOTE — ASSESSMENT & PLAN NOTE
Breast size exceeds double D in cup size.  Patient experiencing thoracolumbar back pain daily, has difficulty exercising including running, jumping, weight lifting secondary to discomfort  Range of motion limited when lifting weights.  She has developed poor posture and her self-esteem has been negatively affected  Patient would benefit greatly from breast reduction  Will send referral to plastic surgery for ongoing management

## 2025-01-15 NOTE — ASSESSMENT & PLAN NOTE
Patient states she has been working on implementing the necessary diet and lifestyle modifications we have discussed  States while the scale does not suggest this, she has noticed that her clothes are beginning to fit better.  Of note, she does feel her exercise regimen is limited secondary to her large breast and discomfort with exercise.  Plans to discuss this further with plastic surgeon.  She also has discontinued alcohol which will likely assist in her ability to lose weight  We will have close follow-up in approximately 3 months.    11/14/2024  Patient up approximately 50 pounds since her freshman year of college.  She states she gained approximately 20 to 25 pounds while in college.  States that she played basketball in college and the exercise helped keep her weight somewhat controlled.  States after graduating she gained approximately 20 or more additional pounds.  She recently started working out and has been working on portion sizes.  We had a lengthy discussion regarding diet and lifestyle modifications.  Recommended keeping a food journal or diary.  Discussed the importance of getting at least 100 to 120 g of protein daily.  Discussed the importance of limiting excess sugar/processed foods.  Discussed the need for caloric deficit in order to achieve weight loss.

## 2025-01-15 NOTE — ASSESSMENT & PLAN NOTE
Breast size a contributing factor  Will move forward with supportive and symptomatic treatment  Referral to plastic surgery sent today

## 2025-01-24 DIAGNOSIS — Z76.0 MEDICATION REFILL: ICD-10-CM

## 2025-01-24 DIAGNOSIS — F41.0 PANIC ANXIETY SYNDROME: ICD-10-CM

## 2025-01-27 RX ORDER — PROPRANOLOL HYDROCHLORIDE 10 MG/1
10 TABLET ORAL DAILY PRN
Qty: 30 TABLET | Refills: 0 | OUTPATIENT
Start: 2025-01-27

## 2025-02-13 ENCOUNTER — PATIENT ROUNDING (BHMG ONLY) (OUTPATIENT)
Dept: URGENT CARE | Facility: CLINIC | Age: 25
End: 2025-02-13
Payer: COMMERCIAL

## 2025-02-19 ENCOUNTER — TELEMEDICINE (OUTPATIENT)
Dept: PSYCHIATRY | Facility: CLINIC | Age: 25
End: 2025-02-19
Payer: COMMERCIAL

## 2025-02-19 DIAGNOSIS — F31.32 BIPOLAR AFFECTIVE DISORDER, CURRENTLY DEPRESSED, MODERATE: Primary | ICD-10-CM

## 2025-02-19 DIAGNOSIS — F34.1 DYSTHYMIA: Chronic | ICD-10-CM

## 2025-02-19 DIAGNOSIS — Z76.0 MEDICATION REFILL: ICD-10-CM

## 2025-02-19 DIAGNOSIS — F41.0 PANIC ANXIETY SYNDROME: ICD-10-CM

## 2025-02-19 RX ORDER — PROPRANOLOL HYDROCHLORIDE 10 MG/1
10 TABLET ORAL DAILY PRN
Qty: 30 TABLET | Refills: 0 | Status: SHIPPED | OUTPATIENT
Start: 2025-02-19

## 2025-02-19 RX ORDER — LAMOTRIGINE 100 MG/1
100 TABLET, EXTENDED RELEASE ORAL DAILY
Qty: 30 TABLET | Refills: 0 | Status: SHIPPED | OUTPATIENT
Start: 2025-02-19

## 2025-02-19 RX ORDER — CITALOPRAM HYDROBROMIDE 40 MG/1
40 TABLET ORAL DAILY
Qty: 30 TABLET | Refills: 0 | Status: SHIPPED | OUTPATIENT
Start: 2025-02-19

## 2025-02-19 NOTE — PROGRESS NOTES
"  This provider is located at the [facility] (through [facility]), [address], [facility], [address] using a secure [employer] Video Visit through [employer]. Patient is being seen remotely via telehealth at their home address in [place], and stated they are in a secure environment for this session. The patient's condition being diagnosed/treated is appropriate for telemedicine. The provider identified herself as well as her credentials.   The patient, and/or patients guardian, consent to be seen remotely, and when consent is given they understand that the consent allows for patient identifiable information to be sent to a third party as needed.   They may refuse to be seen remotely at any time. The electronic data is encrypted and password protected, and the patient and/or guardian has been advised of the potential risks to privacy not withstanding such measures.    You have chosen to receive care through a telehealth visit.  Do you consent to use a video/audio connection for your medical care today? Yes    Patient identifiers utilized: Name and date of birth.    Patient verbally confirmed consent for today's encounter:  February 19, 2025    Levi Brown is a 24 y.o. female who presents today for follow up    Chief Complaint:    Chief Complaint   Patient presents with    Anxiety    Depression    Med Management    Irritable        Referring Provider: Jayme Hennessy LCSW    History of Present Illness:    History of Present Illness  Patient is a 24-year-old female presenting for a follow-up related to dysthymia, irritability, depression and anxiety as well as sleep disturbances.  She is tolerating medications well, voices she is mostly compliant, denies side effects.  Acknowledges depression and anxiety both as relevant.  Remains upon Lamictal and Celexa.  Is using propranolol \"not often, I do not have big anxiety\" and cites use approximately 3 times over the past " "few months.  PHQ remains same score for minimal for depression as patient rates a 4/10 on average, states \"mostly when I wake up, thoughts are kind of dark.\"  Also cites \"guilt but not as bad\" regarding fracture dynamics with parents.  Recently had hard conversations, somewhat contributing to stress.  ISIDRO increase from 4-8, indicating mild anxiety which patient rates as 6/10 on average.  Sleep adequate, cites 6 to 7 hours nightly on average.  She states \"I feel all out of whack, if I could sleep all day I would.\"  She states \"no anxiety attacks.\"  Regarding mood lability \"not irritable, for the most part I am just stressed.\"  This is mostly related to plans for the future.  She states \"I have a lot going on.\"  Also states she has been \"drinking every day, 3-5 shots of vodka, it is a habit, impulsive, boredom, it is slow down a lot.\"  Denies need for resources related to this, states she is tackling on her own.  Does not attend AA.  States this may be a contributing factor to weight gain and is working to reduce this habit due to wanting to lose weight as well as for her mental and physical health.  Denies symptoms of withdrawal or systems consistent with DTs.  Denies SI, HI, SIB, or hallucinations currently and is convincing.  Denies medical status changes aside from recovering from sinusitis, establishing with a new PCP, vitamin D ordered.  Also has plastic surgery consult due to wanted breast reduction.    Patient resides now back at home with her parents.  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  Pursuing other options after taking a break from school for a year.  She plays basketball, tournament starts next week.  Cites her parents remain together, relationship with them is improving.  Good relationships with her brother and her sister.  She is single, no children.  Does have a significant other.  Denies previous arrests.  M[employer]on Sikhism preference.  Enjoys " playing basketball and playing Xbox.  States frequent marijuana use and drinking 3-5 shots of vodka per day currently. Not in therapy.       Last Menstrual Period:  December-irregular    The patient denies any chance of pregnancy at this time.  The patient was educated that her prescribed medications can have potential risk to a developing fetus. The patient is advised to contact this APRN/this office if she becomes pregnant or plans to become pregnant.  Pt verbalizes understanding and acknowledged agreement with this plan in her own words.      The following portions of the patient's history were reviewed and updated as appropriate: allergies, current medications, past family history, past medical history, past social history, past surgical history and problem list.    Past Psychiatric History:  Began Treatment: sophomore year  Diagnoses:Anxiety  Psychiatrist:Denies  Therapist: once a month  Admission History:Denies  Medication Trials: zoloft (I didn't eat on those and gained weight), seroquel (groggy), atarax (vomiting), trazodone (25 makes drowsy)  Self Harm: Denies  Suicide Attempts:Denies   Psychosis, Anxiety, Depression: Denies    Past Medical History:  Past Medical History:   Diagnosis Date    Anxiety     Depression     Insomnia        Substance Abuse History:   Types:Denies all, including illicit  Withdrawal Symptoms:Denies  Longest Period Sober:Not Applicable   AA: Not applicable     Social History:  Social History     Socioeconomic History    Marital status: Single    Number of children: 0   Tobacco Use    Smoking status: Never     Passive exposure: Never    Smokeless tobacco: Never   Vaping Use    Vaping status: Former    Substances: Nicotine    Devices: Disposable    Passive vaping exposure: Yes   Substance and Sexual Activity    Alcohol use: Yes     Alcohol/week: 3.0 standard drinks of alcohol     Types: 3 Shots of liquor per week     Comment: SOC    Drug use: Yes     Types: Marijuana      Comment: occasional    Sexual activity: Yes     Partners: Female     Birth control/protection: None, Partner of same sex       Family History:  Family History   Problem Relation Age of Onset    Hypertension Mother     Hyperlipidemia Mother     Bipolar disorder Brother     Bipolar disorder Maternal Aunt     Heart attack Maternal Uncle     Drug abuse Paternal Aunt     Stomach cancer Maternal Grandmother        Past Surgical History:  Past Surgical History:   Procedure Laterality Date    MOUTH SURGERY      TOE AMPUTATION      PT. WAS BORN WITH EXTRA TOE       Problem List:  Patient Active Problem List   Diagnosis    Depression    Anxiety    Bipolar disorder    Insomnia    Class 1 obesity due to excess calories without serious comorbidity with body mass index (BMI) of 33.0 to 33.9 in adult    Weight gain    Iron deficiency    Fatigue    Large breasts    Thoracolumbar back pain       Allergy:   No Known Allergies     Current Medications:   Current Outpatient Medications   Medication Sig Dispense Refill    citalopram (CeleXA) 40 MG tablet Take 1 tablet by mouth Daily. 30 tablet 0    lamoTRIgine  MG tablet sustained-release 24 hour Take 1 tablet by mouth Daily. 30 tablet 0    propranolol (INDERAL) 10 MG tablet Take 1 tablet by mouth Daily As Needed (anxiety). for anxiety 30 tablet 0     No current facility-administered medications for this visit.       Review of Systems:    Review of Systems   Constitutional: Negative.    HENT: Negative.     Eyes: Negative.    Respiratory: Negative.     Cardiovascular: Negative.    Gastrointestinal: Negative.    Endocrine: Negative.    Genitourinary: Negative.    Musculoskeletal:  Positive for back pain.   Skin: Negative.    Allergic/Immunologic: Negative.    Neurological: Negative.  Negative for seizures.   Hematological: Negative.    Psychiatric/Behavioral:  Positive for dysphoric mood and stress. The patient is nervous/anxious.          Physical Exam:   Physical  Exam  Constitutional:       Appearance: Normal appearance.   HENT:      Head: Normocephalic.      Nose: Nose normal.   Pulmonary:      Effort: Pulmonary effort is normal.   Musculoskeletal:         General: Normal range of motion.      Cervical back: Normal range of motion.   Neurological:      Mental Status: She is alert.   Psychiatric:         Attention and Perception: Attention and perception normal.         Mood and Affect: Affect normal. Mood is anxious. Affect is flat.         Speech: Speech normal.         Behavior: Behavior normal. Behavior is cooperative.         Thought Content: Thought content normal.         Cognition and Memory: Cognition and memory normal.         Judgment: Judgment is impulsive.         Vitals:  not currently breastfeeding. There is no height or weight on file to calculate BMI.  Due to extenuating circumstances and possible current health risks associated with the patient being present in a clinical setting (with current health restrictions in place in regards to possible COVID 19 transmission/exposure), the patient was seen remotely today via a MyChart Video Visit through [facility].  Unable to obtain vital signs due to nature of remote visit.  Height stated at 5ft4.5 inches.  Weight stated at 160 pounds.    Last 3 Blood Pressure Readings:  BP Readings from Last 3 Encounters:   02/12/25 134/78   01/15/25 114/72   11/14/24 126/86       PHQ-9 Score:   PHQ-9 Total Score:  PHQ-9 Depression Screening  Little interest or pleasure in doing things?     Feeling down, depressed, or hopeless?     Trouble falling or staying asleep, or sleeping too much?     Feeling tired or having little energy?     Poor appetite or overeating?     Feeling bad about yourself - or that you are a failure or have let yourself or your family down?     Trouble concentrating on things, such as reading the newspaper or watching television?     Moving or speaking so slowly that other people could have noticed? Or the opposite  - being so fidgety or restless that you have been moving around a lot more than usual?     Thoughts that you would be better off dead, or of hurting yourself in some way?     PHQ-9 Total Score     If you checked off any problems, how difficult have these problems made it for you to do your work, take care of things at home, or get along with other people?           ISIDRO-7 Score:   Feeling nervous, anxious or on edge: (Patient-Rptd) Several days  Not being able to stop or control worrying: (Patient-Rptd) Several days  Worrying too much about different things: (Patient-Rptd) Nearly every day  Trouble Relaxing: (Patient-Rptd) Several days  Being so restless that it is hard to sit still: (Patient-Rptd) Several days  Feeling afraid as if something awful might happen: (Patient-Rptd) Not at all  Becoming easily annoyed or irritable: (Patient-Rptd) Several days  ISIDRO 7 Total Score: (Patient-Rptd) 8  If you checked any problems, how difficult have these problems made it for you to do your work, take care of things at home, or get along with other people: (Patient-Rptd) Somewhat difficult     Mental Status Exam:   Hygiene:   good  Cooperation:  Cooperative  Eye Contact:  Good  Psychomotor Behavior:  Appropriate  Affect:  Full range  Mood: normal  Hopelessness: 2  Speech:  Normal  Thought Process:  Goal directed and Linear  Thought Content:  Normal  Suicidal:  None  Homicidal:  None  Hallucinations:  None  Delusion:  None  Memory:  Intact  Orientation:  Grossly intact  Reliability:  good  Insight:  Fair  Judgement:  Impaired  Impulse Control:  Impaired  Physical/Medical Issues:  No        Lab Results:   Admission on 02/12/2025, Discharged on 02/12/2025   Component Date Value Ref Range Status    SARS Antigen 02/12/2025 Not Detected  Not Detected, Presumptive Negative Final    Influenza A Antigen LIA 02/12/2025 Not Detected  Not Detected Final    Influenza B Antigen LIA 02/12/2025 Not Detected  Not Detected Final    Internal  Control 02/12/2025 Passed  Passed Final    Lot Number 02/12/2025 [id number]   Final    Expiration Date 02/12/2025 11/21/2025   Final    Rapid Strep A Screen 02/12/2025 Negative   Final    Internal Control 02/12/2025 Passed   Final    Lot Number 02/12/2025 [id number]   Final    Expiration Date 02/12/2025 03/06/2027   Final   Office Visit on 11/14/2024   Component Date Value Ref Range Status    Glucose 11/14/2024 71  65 - 99 mg/dL Final    BUN 11/14/2024 11  6 - 20 mg/dL Final    Creatinine 11/14/2024 0.65  0.57 - 1.00 mg/dL Final    Sodium 11/14/2024 135 (L)  136 - 145 mmol/L Final    Potassium 11/14/2024 4.1  3.5 - 5.2 mmol/L Final    Chloride 11/14/2024 98  98 - 107 mmol/L Final    CO2 11/14/2024 23.0  22.0 - 29.0 mmol/L Final    Calcium 11/14/2024 9.5  8.6 - 10.5 mg/dL Final    Total Protein 11/14/2024 7.7  6.0 - 8.5 g/dL Final    Albumin 11/14/2024 4.5  3.5 - 5.2 g/dL Final    ALT (SGPT) 11/14/2024 72 (H)  1 - 33 U/L Final    AST (SGOT) 11/14/2024 53 (H)  1 - 32 U/L Final    Alkaline Phosphatase 11/14/2024 79  39 - 117 U/L Final    Total Bilirubin 11/14/2024 0.3  0.0 - 1.2 mg/dL Final    Globulin 11/14/2024 3.2  gm/dL Final    A/G Ratio 11/14/2024 1.4  g/dL Final    BUN/Creatinine Ratio 11/14/2024 16.9  7.0 - 25.0 Final    Anion Gap 11/14/2024 14.0  5.0 - 15.0 mmol/L Final    eGFR 11/14/2024 127.8  >60.0 mL/min/1.73 Final    Total Cholesterol 11/14/2024 176  0 - 200 mg/dL Final    Triglycerides 11/14/2024 51  0 - 150 mg/dL Final    HDL Cholesterol 11/14/2024 62 (H)  40 - 60 mg/dL Final    LDL Cholesterol  11/14/2024 104 (H)  0 - 100 mg/dL Final    VLDL Cholesterol 11/14/2024 10  5 - 40 mg/dL Final    LDL/HDL Ratio 11/14/2024 1.67   Final    TSH 11/14/2024 2.550  0.270 - 4.200 uIU/mL Final    Free T4 11/14/2024 0.93  0.92 - 1.68 ng/dL Final    25 Hydroxy, Vitamin D 11/14/2024 21.1 (L)  30.0 - 100.0 ng/ml Final    Vitamin B-12 11/14/2024 894  211 - 946 pg/mL Final    Folate 11/14/2024 8.43  4.78 - 24.20 ng/mL Final     Iron 11/14/2024 73  37 - 145 mcg/dL Final    Iron Saturation (TSAT) 11/14/2024 12 (L)  20 - 50 % Final    Transferrin 11/14/2024 409 (H)  200 - 360 mg/dL Final    TIBC 11/14/2024 609 (H)  298 - 536 mcg/dL Final    Ferritin 11/14/2024 27.60  13.00 - 150.00 ng/mL Final    WBC 11/14/2024 7.38  3.40 - 10.80 10*3/mm3 Final    RBC 11/14/2024 4.74  3.77 - 5.28 10*6/mm3 Final    Hemoglobin 11/14/2024 12.9  12.0 - 15.9 g/dL Final    Hematocrit 11/14/2024 42.1  34.0 - 46.6 % Final    MCV 11/14/2024 88.8  79.0 - 97.0 fL Final    MCH 11/14/2024 27.2  26.6 - 33.0 pg Final    MCHC 11/14/2024 30.6 (L)  31.5 - 35.7 g/dL Final    RDW 11/14/2024 15.2  12.3 - 15.4 % Final    RDW-SD 11/14/2024 49.4  37.0 - 54.0 fl Final    MPV 11/14/2024 9.9  6.0 - 12.0 fL Final    Platelets 11/14/2024 330  140 - 450 10*3/mm3 Final    Neutrophil % 11/14/2024 50.0  42.7 - 76.0 % Final    Lymphocyte % 11/14/2024 34.0  19.6 - 45.3 % Final    Monocyte % 11/14/2024 10.7  5.0 - 12.0 % Final    Eosinophil % 11/14/2024 3.9  0.3 - 6.2 % Final    Basophil % 11/14/2024 0.7  0.0 - 1.5 % Final    Immature Grans % 11/14/2024 0.7 (H)  0.0 - 0.5 % Final    Neutrophils, Absolute 11/14/2024 3.69  1.70 - 7.00 10*3/mm3 Final    Lymphocytes, Absolute 11/14/2024 2.51  0.70 - 3.10 10*3/mm3 Final    Monocytes, Absolute 11/14/2024 0.79  0.10 - 0.90 10*3/mm3 Final    Eosinophils, Absolute 11/14/2024 0.29  0.00 - 0.40 10*3/mm3 Final    Basophils, Absolute 11/14/2024 0.05  0.00 - 0.20 10*3/mm3 Final    Immature Grans, Absolute 11/14/2024 0.05  0.00 - 0.05 10*3/mm3 Final    nRBC 11/14/2024 0.0  0.0 - 0.2 /100 WBC Final         Assessment & Plan   Problems Addressed this Visit          Mental Health    Bipolar disorder - Primary    Relevant Medications    citalopram (CeleXA) 40 MG tablet    lamoTRIgine  MG tablet sustained-release 24 hour     Other Visit Diagnoses       Panic anxiety syndrome        Relevant Medications    citalopram (CeleXA) 40 MG tablet    propranolol  (INDERAL) 10 MG tablet    Dysthymia  (Chronic)       Relevant Medications    citalopram (CeleXA) 40 MG tablet    Medication refill        Relevant Medications    citalopram (CeleXA) 40 MG tablet    lamoTRIgine  MG tablet sustained-release 24 hour    propranolol (INDERAL) 10 MG tablet          Diagnoses         Codes Comments    Bipolar affective disorder, currently depressed, moderate    -  Primary ICD-10-CM: F31.32  ICD-9-CM: 296.52     Panic anxiety syndrome     ICD-10-CM: F41.0  ICD-9-CM: 300.01     Dysthymia     ICD-10-CM: F34.1  ICD-9-CM: 300.4     Medication refill     ICD-10-CM: Z76.0  ICD-9-CM: V68.1             Visit Diagnoses:    ICD-10-CM ICD-9-CM   1. Bipolar affective disorder, currently depressed, moderate  F31.32 296.52   2. Panic anxiety syndrome  F41.0 300.01   3. Dysthymia  F34.1 300.4   4. Medication refill  Z76.0 V68.1       Lamictal and propranolol refilled.  We will increase Celexa to 40 mg daily. Previously educated upon side effects with use of these medications as well as when to present for emergency services, denies at this time. Instructions sent regarding use of medications attached to visit of initial prescribing date.  Follow up with this provider in 4 weeks or sooner if needed.    Discussed need marijuana use with current medications could exacerbate mental health symptoms, leading to further depression or psychosis. There is also some research to support psychotropics and mood stabilizers with marijuana use could reduce efficacy of medications. may increase side effects such as dizziness, drowsiness, confusion, and difficulty concentrating. Some people, especially the elderly, may also experience impairment in thinking, judgment, and motor coordination. Discussed with patient need to practice cessation, verbalized understanding.    Risks, benefits, alternatives discussed with patient including GI upset, nausea vomiting diarrhea, theoretical decrease of seizure threshold  predisposing the patient to a slightly higher seizure risk, headaches, sexual dysfunction, serotonin syndrome, bleeding risk, increased suicidality in patients 24 years and younger, switching to rosey/hypomania.  After discussion of these risks and benefits, the patient voiced understanding and agreed to proceed.     Discussed with pt that combining ETOH with prescribed antidepressants can worsen depression in addition to causing drowsiness, nausea, dizziness, impaired motor control, and increasing risk of cardiovascular events. The combination has the potential to be fatal. Therefore, it is highly recommended that the patient avoid ETOH use while on psychotropic medications. This APRN offered pt resources for ETOH treatment and pt declined    GOALS:  Short Term Goals: Patient will be compliant with medication, and patient will have no significant medication related side effects.  Patient will be engaged in psychotherapy as indicated.  Patient will report subjective improvement of symptoms.  Long term goals: To stabilize mood and treat/improve subjective symptoms, the patient will stay out of the hospital, the patient will be at an optimal level of functioning, and the patient will take all medications as prescribed.  The patient/guardian verbalized understanding and agreement with goals that were mutually set.      TREATMENT PLAN: Continue supportive psychotherapy efforts and medications as indicated.  Pharmacological and Non-Pharmacological treatment options discussed during today's visit. Patient/Guardian acknowledged and verbally consented with current treatment plan and was educated on the importance of compliance with treatment and follow-up appointments.      MEDICATION ISSUES:  Discussed medication options and treatment plan of prescribed medication as well as the risks, benefits, any black box warnings, and side effects including potential falls, possible impaired driving, and metabolic adversities among  others. Patient is agreeable to call the office with any worsening of symptoms or onset of side effects, or if any concerns or questions arise.  The contact information for the office is made available to the patient. Patient is agreeable to call 911 or go to the nearest ER should they begin having any SI/HI, or if any urgent concerns arise. No medication side effects or related complaints today.     MEDS ORDERED DURING VISIT:  New Medications Ordered This Visit   Medications    citalopram (CeleXA) 40 MG tablet     Sig: Take 1 tablet by mouth Daily.     Dispense:  30 tablet     Refill:  0    lamoTRIgine  MG tablet sustained-release 24 hour     Sig: Take 1 tablet by mouth Daily.     Dispense:  30 tablet     Refill:  0    propranolol (INDERAL) 10 MG tablet     Sig: Take 1 tablet by mouth Daily As Needed (anxiety). for anxiety     Dispense:  30 tablet     Refill:  0       Follow Up Appointment:   Return in about 4 weeks (around 3/19/2025) for Recheck.             This document has been electronically signed by RIVER Casper  February 19, 2025 15:15 EST    Some of the data in this electronic note has been brought forward from a previous encounter, any necessary changes have been made, it has been reviewed by this APRN, and it is accurate.      Dictated Utilizing Dragon Dictation: Part of this note may be an electronic transcription/translation of spoken language to printed text using the Dragon Dictation System.

## 2025-03-17 ENCOUNTER — TELEPHONE (OUTPATIENT)
Dept: PSYCHIATRY | Facility: CLINIC | Age: 25
End: 2025-03-17
Payer: COMMERCIAL

## 2025-03-17 DIAGNOSIS — F41.0 PANIC ANXIETY SYNDROME: ICD-10-CM

## 2025-03-17 DIAGNOSIS — Z76.0 MEDICATION REFILL: ICD-10-CM

## 2025-03-17 DIAGNOSIS — F34.1 DYSTHYMIA: Chronic | ICD-10-CM

## 2025-03-17 DIAGNOSIS — F31.32 BIPOLAR AFFECTIVE DISORDER, CURRENTLY DEPRESSED, MODERATE: ICD-10-CM

## 2025-03-17 RX ORDER — PROPRANOLOL HYDROCHLORIDE 10 MG/1
10 TABLET ORAL DAILY PRN
Qty: 30 TABLET | Refills: 0 | Status: SHIPPED | OUTPATIENT
Start: 2025-03-17

## 2025-03-17 RX ORDER — LAMOTRIGINE 100 MG/1
100 TABLET, EXTENDED RELEASE ORAL DAILY
Qty: 30 TABLET | Refills: 0 | Status: SHIPPED | OUTPATIENT
Start: 2025-03-17

## 2025-03-17 RX ORDER — CITALOPRAM HYDROBROMIDE 40 MG/1
40 TABLET ORAL DAILY
Qty: 30 TABLET | Refills: 0 | Status: SHIPPED | OUTPATIENT
Start: 2025-03-17

## 2025-03-17 NOTE — TELEPHONE ENCOUNTER
CARON and sent [employer] message that appointment for 03/20/25 has been canceled and for patient to call the office back to r/s.

## 2025-03-17 NOTE — TELEPHONE ENCOUNTER
Provider out of office 3/20/2025.  Patient rescheduled for 04/07.  Patient needs refills. Please advise.

## 2025-03-24 ENCOUNTER — TELEMEDICINE (OUTPATIENT)
Dept: PSYCHIATRY | Facility: CLINIC | Age: 25
End: 2025-03-24
Payer: COMMERCIAL

## 2025-03-24 DIAGNOSIS — F31.32 BIPOLAR AFFECTIVE DISORDER, CURRENTLY DEPRESSED, MODERATE: Primary | ICD-10-CM

## 2025-03-24 DIAGNOSIS — F41.1 GENERALIZED ANXIETY DISORDER: ICD-10-CM

## 2025-03-24 PROCEDURE — 90832 PSYTX W PT 30 MINUTES: CPT | Performed by: SOCIAL WORKER

## 2025-03-24 NOTE — TREATMENT PLAN
Multi-Disciplinary Problems (from Behavioral Health Treatment Plan)      Active Problems       Problem: Ineffective Coping  Start Date: 03/24/25      Problem Details: The patient self-scales this problem as a 8 with 10 being the worst. Patient will identifying and engage in alternative activities to cope with her emotions than drinking alcohol and spending money.          Goal Priority Start Date Expected End Date End Date    Patient will demonstrate the ability to initiate new constructive life skills consistently. -- 03/24/25 09/22/25 --    Goal Details: Progress toward goal:  Not appropriate to rate progress toward goal since this is the initial treatment plan.        Goal Intervention Frequency Start Date End Date    Assist patient in identifying healthy coping behaviors. Q4 Weeks 03/24/25 --    Intervention Details: Duration of treatment until discharged.                Problem: Mood Instability  Start Date: 03/24/25      Problem Details: The patient self-scales this problem as a 7 with 10 being the worst. Patient will build routines to maintain to decrease lack of motivation to completely daily tasks.           Goal Priority Start Date Expected End Date End Date    Patient will achieve mood stability as evidenced by controlled behavior and a more deliberate thought process -- 03/24/25 09/22/25 --    Goal Details: Progress toward goal:  Not appropriate to rate progress toward goal since this is the initial treatment plan.        Goal Intervention Frequency Start Date End Date    Provide structure and focus to patient's thoughts and actions by establishing plans and routine. Q4 Weeks 03/24/25 --    Intervention Details: Duration of treatment until discharged.        Goal Intervention Frequency Start Date End Date    Assist patient in setting responsible goals and limits in behavior. Q4 Weeks 03/24/25 --    Intervention Details: Duration of treatment until discharged.                               I have  discussed and reviewed this treatment plan with the patient.

## 2025-03-24 NOTE — PROGRESS NOTES
"[facility]   Follow-up Progress Note     Date: March 24, 2025  Time In: 12:32  Time Out: 1:07      PROGRESS NOTE  Data:  Saba Brown is a 24 y.o. female presenting to [facility] (through [facility]), [address], [place], [address] using a secure MyChart Video Visit through [facility] for assessment with Jayme Hennessy LCSW. The patient is seen remotely in their  work  using [facility] My Chart. Patient is being seen via telehealth and stated they are in a secure environment for this session. The patient's condition being diagnosed/treated is appropriate for telemedicine. The provider identified herself as well as her credentials. The patient and/or patients guardian consent to be seen remotely, and when consent is given they understand that the consent allows for patient identifiable information to be sent to a third party as needed. They may refuse to be seen remotely at any time. The electronic data is encrypted and password protected, and the patient has been advised of the potential risks to privacy not withstanding such measures.    Today Patient stated she is currently at work. Patient stated \"a lot has happened.\" Patient reported that she graduated last May. Patient stated she has learned more about \"what is going on.\" Patient stated it has been relieving but also trying to process her emotions. Patient stated starting medication has been helpful but still has difficulties with motivation and poor sleep. Patient stated she also struggles with waking up. Patient reported that she has \"a lot of bad habits, just bad addictions to stuff.\" Patient stated this has caused significant weight gain. Patient reported she has struggled with drinking alcohol but has decreased use and consistently taking her medication. Patient stated she is currently living with her parents. Patient stated she did inform them of her " "sexuality and that patient has a girlfriend. Patient stated her mother did not take the news well \"and my dad was like okay.\" Patient stated this has negatively impacted her relationships with her parents stating \"I just leave to go see my girlfriend and don't really say anything.\" Patient engaged in reviewing goals for treatment. Patient was engaged in discussing engaging in activities that bring her apolinar. Patient reported that she has been wanting to go fishing but no one has been available or willing to go. Discussed with patient engaging in enjoyable activities for herself and not others.       Clinical Maneuvering/Intervention:    Chief Complaint: mood instability, anxiety     (Scales based on 0 - 10 with 10 being the worst)  Depression: 0 Anxiety: 0     Assisted Patient in processing above session content; acknowledged and normalized patient’s thoughts, feelings, and concerns.  Rationalized patient thought process regarding reestablishing treatment and identifying goals for treatment.  Discussed triggers associated with patient's mood instability.  Also discussed coping skills for patient to implement such as going fishing.    Allowed Patient to freely discuss issues  without interruption or judgement with unconditional positive regard, active listening skills, and empathy. Therapist provided a safe, confidential environment to facilitate the development of a positive therapeutic relationship and encouraged open, honest communication. Assisted Patient in identifying risk factors which would indicate the need for higher level of care including thoughts to harm self or others and/or self-harming behavior and encouraged Patient to contact this office, call 911, or present to the nearest emergency room should any of these events occur. Discussed crisis intervention services and means to access. Patient adamantly and convincingly denies current suicidal or homicidal ideation or perceptual disturbance. Assisted " Patient in processing session content; acknowledged and normalized Patient’s thoughts, feelings, and concerns by utilizing a person-centered approach in efforts to build appropriate rapport and a positive therapeutic relationship with open and honest communication. Therapist utilized dialectical behavior techniques to teach and model emotional regulation and relaxation methods. Therapist assisted Patient with identifying and implementing healthier coping strategies.     Assessment   Patient appears to be experiencing heightened anxiety and depression in response to COVID-19 epidemic  As a result, they can be reasonably expected to continue to benefit from treatment and would likely be at increased risk for decompensation otherwise.    Mental Status Exam:   Hygiene:   good  Cooperation:  Cooperative  Eye Contact:  Good  Psychomotor Behavior:  Appropriate  Affect:  Full range  Mood:  euphoric   Speech:  Normal  Thought Process:  Goal directed  Thought Content:  Mood congruent  Suicidal:  None  Homicidal:  None  Hallucinations:  None  Delusion:  None  Memory:  Intact  Orientation:  Person, Place, Time, and Situation  Reliability:  good  Insight:  Good  Judgement:  Good  Impulse Control:  Fair  Physical/Medical Issues:  No      PHQ-Score Total:  PHQ-9 Total Score:  12      Patient's Support Network Includes:  significant other    Functional Status: Moderate impairment     Progress toward goal: Not at goal    Prognosis: Good with Ongoing Treatment            Impression/Formulation:    VISIT DIAGNOSIS:     ICD-10-CM ICD-9-CM   1. Bipolar affective disorder, currently depressed, moderate  F31.32 296.52   2. Generalized anxiety disorder  F41.1 300.02        Patient appeared alert and oriented.  Patient is voluntarily requesting to continue outpatient therapy at [facility].  Patient is receptive to assistance with maintaining a stable lifestyle.  Patient presents with history of mood  instability and anxiety.  Patient is agreeable to attend routine therapy sessions.  Patient expressed desire to maintain stability and participate in the therapeutic process.        Crisis Plan:  Symptoms and/or behaviors to indicate a crisis: Prolonged irritability or anger    What calming techniques or other strategies will patient use to de-esclate and stay safe: slow down, breathe, visualize calming self, think it though, listen to music, change focus, take a walk    Who is one person patient can contact to assist with de-escalation? girlfriend    If symptoms/behaviors persist, Patient will present to the nearest hospital for an assessment. Advised patient of [facility] ER and assessment services.     Plan:   Patient will continue in individual outpatient therapy with focus on improved functioning and coping skills, maintaining stability, and avoiding decompensation and the need for higher level of care.    Patient will contact this office ([facility] at [phone]), call 911 or present to the nearest emergency room should suicidal or homicidal ideations occur. Provide Cognitive Behavioral Therapy and Solution Focused Therapy to improve functioning, maintain stability, and avoid decompensation and the need for higher level of care.     Return in about 4 weeks, or earlier if symptoms worsen or fail to improve.    Recommended Referrals: none        This document has been electronically signed by Jayme Hennessy LCSW  March 24, 2025 12:32 EDT        Part of this note may be an electronic transcription/translation of spoken language to printed text using the Dragon Dictation System.    Mode of Visit: Video  Location of patient: -WORK-  Location of provider: +HOME+  You have chosen to receive care through a telehealth visit.  The patient has signed the video visit consent form.  The visit included audio and video interaction. No technical issues occurred during this visit.

## 2025-03-24 NOTE — PLAN OF CARE
Patient was engaged in identifying goals for treatment. Patient expressed she would like to engage in alternative coping behaviors to decrease drinking alcohol and spending money. Patient will also build consistent routines to decrease lack of motivation to complete daily tasks.   Problem: Ineffective Coping 8  Goal: Patient will demonstrate the ability to initiate new constructive life skills consistently.  Outcome: Progressing     Problem: Mood Instability 7  Goal: Patient will achieve mood stability as evidenced by controlled behavior and a more deliberate thought process  Outcome: Progressing

## 2025-04-17 DIAGNOSIS — F34.1 DYSTHYMIA: Chronic | ICD-10-CM

## 2025-04-17 DIAGNOSIS — F31.32 BIPOLAR AFFECTIVE DISORDER, CURRENTLY DEPRESSED, MODERATE: ICD-10-CM

## 2025-04-17 DIAGNOSIS — F41.0 PANIC ANXIETY SYNDROME: ICD-10-CM

## 2025-04-17 DIAGNOSIS — Z76.0 MEDICATION REFILL: ICD-10-CM

## 2025-04-17 RX ORDER — CITALOPRAM HYDROBROMIDE 40 MG/1
40 TABLET ORAL DAILY
Qty: 30 TABLET | Refills: 0 | OUTPATIENT
Start: 2025-04-17

## 2025-04-17 RX ORDER — PROPRANOLOL HYDROCHLORIDE 10 MG/1
10 TABLET ORAL DAILY PRN
Qty: 30 TABLET | Refills: 0 | OUTPATIENT
Start: 2025-04-17

## 2025-04-17 RX ORDER — LAMOTRIGINE 100 MG/1
1 TABLET, EXTENDED RELEASE ORAL DAILY
Qty: 30 TABLET | Refills: 0 | OUTPATIENT
Start: 2025-04-17

## 2025-04-17 NOTE — TELEPHONE ENCOUNTER
Lvm for patient to call office schedule appointment once patient is schedule provider can send enough medication to hold til appointment

## 2025-05-03 RX ORDER — ERGOCALCIFEROL 1.25 MG/1
50000 CAPSULE, LIQUID FILLED ORAL WEEKLY
Qty: 12 CAPSULE | Refills: 1 | Status: SHIPPED | OUTPATIENT
Start: 2025-05-03

## 2025-05-22 DIAGNOSIS — F31.32 BIPOLAR AFFECTIVE DISORDER, CURRENTLY DEPRESSED, MODERATE: ICD-10-CM

## 2025-05-22 DIAGNOSIS — F41.0 PANIC ANXIETY SYNDROME: ICD-10-CM

## 2025-05-22 DIAGNOSIS — Z76.0 MEDICATION REFILL: ICD-10-CM

## 2025-05-22 DIAGNOSIS — F34.1 DYSTHYMIA: Chronic | ICD-10-CM

## 2025-05-22 RX ORDER — LAMOTRIGINE 100 MG/1
100 TABLET, EXTENDED RELEASE ORAL DAILY
Qty: 30 TABLET | Refills: 0 | Status: SHIPPED | OUTPATIENT
Start: 2025-05-22

## 2025-05-22 RX ORDER — CITALOPRAM HYDROBROMIDE 40 MG/1
40 TABLET ORAL DAILY
Qty: 30 TABLET | Refills: 0 | Status: SHIPPED | OUTPATIENT
Start: 2025-05-22

## 2025-05-29 ENCOUNTER — TELEMEDICINE (OUTPATIENT)
Dept: PSYCHIATRY | Facility: CLINIC | Age: 25
End: 2025-05-29
Payer: COMMERCIAL

## 2025-05-29 DIAGNOSIS — F31.32 BIPOLAR AFFECTIVE DISORDER, CURRENTLY DEPRESSED, MODERATE: Primary | ICD-10-CM

## 2025-05-29 DIAGNOSIS — F41.1 GENERALIZED ANXIETY DISORDER: ICD-10-CM

## 2025-05-29 NOTE — PROGRESS NOTES
"[facility]   Follow-up Progress Note     Date: May 29, 2025  Time In: 9:14  Time Out: 9:50      PROGRESS NOTE  Data:  Saba Brown is a 24 y.o. female presenting to [facility] (through [facility]), [address], [place], [address] using a secure MyChart Video Visit through [facility] for assessment with Jayme Hennessy LCSW. The patient is seen remotely in their  work  using [facility] My Chart. Patient is being seen via telehealth and stated they are in a secure environment for this session. The patient's condition being diagnosed/treated is appropriate for telemedicine. The provider identified herself as well as her credentials. The patient and/or patients guardian consent to be seen remotely, and when consent is given they understand that the consent allows for patient identifiable information to be sent to a third party as needed. They may refuse to be seen remotely at any time. The electronic data is encrypted and password protected, and the patient has been advised of the potential risks to privacy not withstanding such measures.    Today Patient stated she has been looking for a new job to be able to move to [place]. Patient stated she has more family in the area and would be closer to her girlfriend. Patient stated she has tried to spend more time with her parents but their relationship hasn't changed. Patient stated they still are not supportive of her relationship or sexuality. Patient reported that she has been prioritizing engaging in enjoyable activities. Patient stated she went fishing a couple of times alone but her girlfriend has recently been joining. Patient reported she does experience guilt for doing enjoyable activities. Patient stated overall she feels guilt \"for living how I want to live and becoming independent.\" Patient recognized that her parents significantly contribute to this feeling, " "\"mostly my mom.\" Patient processed that she expected that her parents would not be overly supportive but did not expect this level of guilt from them. Engaged patient in setting a goal to walk outside for 15 minutes twice a week to build comfort with engaging in enjoyable activities.     *Patient was experiencing technical issues logging into the appointment. During the appointment patient continued to experience issues with her sound and was not able to hear provider. Appointment was completed through a telephone call. *    Clinical Maneuvering/Intervention:    Chief Complaint: mood instability    (Scales based on 0 - 10 with 10 being the worst)  Depression: 2 Anxiety: 3     Assisted Patient in processing above session content; acknowledged and normalized patient’s thoughts, feelings, and concerns.  Rationalized patient thought process regarding guilt towards doing activities that make patient happy.  Discussed triggers associated with patient's mood instability.  Also discussed coping skills for patient to implement such as walking.    Allowed Patient to freely discuss issues  without interruption or judgement with unconditional positive regard, active listening skills, and empathy. Therapist provided a safe, confidential environment to facilitate the development of a positive therapeutic relationship and encouraged open, honest communication. Assisted Patient in identifying risk factors which would indicate the need for higher level of care including thoughts to harm self or others and/or self-harming behavior and encouraged Patient to contact this office, call 911, or present to the nearest emergency room should any of these events occur. Discussed crisis intervention services and means to access. Patient adamantly and convincingly denies current suicidal or homicidal ideation or perceptual disturbance. Assisted Patient in processing session content; acknowledged and normalized Patient’s thoughts, feelings, and " concerns by utilizing a person-centered approach in efforts to build appropriate rapport and a positive therapeutic relationship with open and honest communication. Therapist utilized dialectical behavior techniques to teach and model emotional regulation and relaxation methods. Therapist assisted Patient with identifying and implementing healthier coping strategies.     Assessment   Patient appears to be experiencing heightened anxiety and depression in response to COVID-19 epidemic  As a result, they can be reasonably expected to continue to benefit from treatment and would likely be at increased risk for decompensation otherwise.    Mental Status Exam:   Hygiene:   good  Cooperation:  Cooperative  Eye Contact:  Good  Psychomotor Behavior:  Appropriate  Affect:  Full range  Mood:  happy  Speech:  Normal  Thought Process:  Goal directed  Thought Content:  Mood congruent  Suicidal:  None  Homicidal:  None  Hallucinations:  None  Delusion:  None  Memory:  Intact  Orientation:  Person, Place, Time, and Situation  Reliability:  good  Insight:  Good  Judgement:  Good  Impulse Control:  Good  Physical/Medical Issues:  No      PHQ-Score Total:  PHQ-9 Total Score:        Patient's Support Network Includes:  significant other    Functional Status: Moderate impairment     Progress toward goal: Not at goal    Prognosis: Good with Ongoing Treatment            Impression/Formulation:    VISIT DIAGNOSIS:     ICD-10-CM ICD-9-CM   1. Bipolar affective disorder, currently depressed, moderate  F31.32 296.52   2. Generalized anxiety disorder  F41.1 300.02        Patient appeared alert and oriented.  Patient is voluntarily requesting to continue outpatient therapy at [facility].  Patient is receptive to assistance with maintaining a stable lifestyle.  Patient presents with history of mood instability and anxiety.  Patient is agreeable to attend routine therapy sessions.  Patient expressed desire to  maintain stability and participate in the therapeutic process.        Crisis Plan:  Symptoms and/or behaviors to indicate a crisis: Prolonged irritability or anger    What calming techniques or other strategies will patient use to de-esclate and stay safe: slow down, breathe, visualize calming self, think it though, listen to music, change focus, take a walk    Who is one person patient can contact to assist with de-escalation? girlfriend    If symptoms/behaviors persist, Patient will present to the nearest hospital for an assessment. Advised patient of [facility] ER and assessment services.     Plan:   Patient will continue in individual outpatient therapy with focus on improved functioning and coping skills, maintaining stability, and avoiding decompensation and the need for higher level of care.    Patient will contact this office ([facility] at [phone]), call 911 or present to the nearest emergency room should suicidal or homicidal ideations occur. Provide Cognitive Behavioral Therapy and Solution Focused Therapy to improve functioning, maintain stability, and avoid decompensation and the need for higher level of care.     Return in about 4 weeks, or earlier if symptoms worsen or fail to improve.    Recommended Referrals: none        This document has been electronically signed by Jayme Hennessy LCSW  May 29, 2025 09:14 EDT        Part of this note may be an electronic transcription/translation of spoken language to printed text using the Dragon Dictation System.    Mode of Visit: Video  Location of patient: -WORK-  Location of provider: +HOME+  You have chosen to receive care through a telehealth visit.  The patient has signed the video visit consent form.  The visit included audio and video interaction. No technical issues occurred during this visit.

## 2025-06-02 ENCOUNTER — TELEMEDICINE (OUTPATIENT)
Dept: PSYCHIATRY | Facility: CLINIC | Age: 25
End: 2025-06-02
Payer: COMMERCIAL

## 2025-06-02 DIAGNOSIS — Z76.0 MEDICATION REFILL: ICD-10-CM

## 2025-06-02 DIAGNOSIS — F31.32 BIPOLAR AFFECTIVE DISORDER, CURRENTLY DEPRESSED, MODERATE: Primary | ICD-10-CM

## 2025-06-02 DIAGNOSIS — F34.1 DYSTHYMIA: Chronic | ICD-10-CM

## 2025-06-02 DIAGNOSIS — F41.0 PANIC ANXIETY SYNDROME: ICD-10-CM

## 2025-06-02 PROCEDURE — 96127 BRIEF EMOTIONAL/BEHAV ASSMT: CPT

## 2025-06-02 PROCEDURE — 99214 OFFICE O/P EST MOD 30 MIN: CPT

## 2025-06-02 RX ORDER — LAMOTRIGINE 100 MG/1
200 TABLET ORAL DAILY
Qty: 60 TABLET | Refills: 0 | Status: SHIPPED | OUTPATIENT
Start: 2025-06-02

## 2025-06-02 RX ORDER — CITALOPRAM HYDROBROMIDE 40 MG/1
40 TABLET ORAL DAILY
Qty: 30 TABLET | Refills: 0 | Status: SHIPPED | OUTPATIENT
Start: 2025-06-02

## 2025-06-02 RX ORDER — PROPRANOLOL HYDROCHLORIDE 10 MG/1
10 TABLET ORAL DAILY PRN
Qty: 30 TABLET | Refills: 0 | Status: SHIPPED | OUTPATIENT
Start: 2025-06-02

## 2025-06-02 NOTE — PROGRESS NOTES
"  This provider is located at the [facility] (through [facility]), [address], [place], [address] using a secure [employer]hart Video Visit through [employer]. Patient is being seen remotely via telehealth at their home address in [place], and stated they are in a secure environment for this session. The patient's condition being diagnosed/treated is appropriate for telemedicine. The provider identified herself as well as her credentials.   The patient, and/or patients guardian, consent to be seen remotely, and when consent is given they understand that the consent allows for patient identifiable information to be sent to a third party as needed.   They may refuse to be seen remotely at any time. The electronic data is encrypted and password protected, and the patient and/or guardian has been advised of the potential risks to privacy not withstanding such measures.    You have chosen to receive care through a telehealth visit.  Do you consent to use a video/audio connection for your medical care today? Yes    Patient identifiers utilized: Name and date of birth.    Patient verbally confirmed consent for today's encounter:  June 2, 2025    Levi Brown is a 24 y.o. female who presents today for follow up    Chief Complaint:    Chief Complaint   Patient presents with    Anxiety    Depression    Med Management    Panic Attack        Referring Provider: Jayme Hennessy LCSW    History of Present Illness:    History of Present Illness  Patient is a 24-year-old female presenting for a follow-up related to dysthymia, irritability, depression and anxiety as well as sleep disturbances.  Was that she is mostly compliant with medications, has missed approximately 2 days.  Denies side effects.  PHQ increase in 4-8, indicating mild depression as patient rates a 5/10 on average.  States \"not as much as last year it is here and there.  I still sometimes cry and do not want to go " "to work.\"  States she has missed work due to these conditions.  Mood has been \"actually okay, this morning I am not having a great time.  I have been having really bad depression in the mornings.\"  ISIDRO reduced from 8 to 7, and again mild anxiety as patient rates at 3/10 on average.  She denies SI, HI, SIB, or hallucinations currently and is convincing.  States \"I would not say suicidal I am just tired in the mornings.\"  Denies intention or plan.  States she has reduced alcohol use to every other day use and is working to further reduce use.  Propranolol she utilizes \"here and there about once a week, I sort of feel groggy I do not know why.\"  Sleep adequate \"6 to 7 hours more than I have been sleeping.\"  She also acknowledges \"weed helps me a lot, I know it is bad\" related to depression and anxiety.  Denies medical status changes.  Denies symptoms consistent with rosey or psychosis.    Patient resides now back at home with her parents.  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    She plays basketball, tournament starts next week.  Cites her parents remain together, relationship with them is improving.  Good relationships with her brother and her sister.  She is single, no children.  Does have a significant other.  Denies previous arrests.  Restorationist Muslim preference.  Enjoys playing basketball and playing Xbox.  States frequent marijuana use and drinking 3-5 shots of vodka every other day currently. Currently in therapy.  Some stress with family dynamics, her mother is pushing her to not take medications according to patient.       Last Menstrual Period:  April 21something-irregular    The patient denies any chance of pregnancy at this time.  The patient was educated that her prescribed medications can have potential risk to a developing fetus. The patient is advised to contact this APRN/this office if she becomes pregnant or plans to become pregnant.  Pt verbalizes understanding and " acknowledged agreement with this plan in her own words.      The following portions of the patient's history were reviewed and updated as appropriate: allergies, current medications, past family history, past medical history, past social history, past surgical history and problem list.    Past Psychiatric History:  Began Treatment:sophomore year  Diagnoses:Anxiety  Psychiatrist:Chon  Therapist:once a month  Admission History:Denies  Medication Trials:zoloft (I didn't eat on those and gained weight), seroquel (groggy), atarax (vomiting), trazodone (25 makes drowsy)  Self Harm: Denies  Suicide Attempts:Denies   Psychosis, Anxiety, Depression: Denies    Past Medical History:  Past Medical History:   Diagnosis Date    Anxiety     Depression     Insomnia        Substance Abuse History:   Types:Denies all, including illicit  Withdrawal Symptoms:Denies  Longest Period Sober:Not Applicable   AA: Not applicable     Social History:  Social History     Socioeconomic History    Marital status: Single    Number of children: 0   Tobacco Use    Smoking status: Never     Passive exposure: Never    Smokeless tobacco: Never   Vaping Use    Vaping status: Former    Substances: Nicotine    Devices: Disposable    Passive vaping exposure: Yes   Substance and Sexual Activity    Alcohol use: Yes     Alcohol/week: 3.0 standard drinks of alcohol     Types: 3 Shots of liquor per week     Comment: SOC    Drug use: Yes     Types: Marijuana     Comment: occasional    Sexual activity: Yes     Partners: Female     Birth control/protection: None, Partner of same sex       Family History:  Family History   Problem Relation Age of Onset    Hypertension Mother     Hyperlipidemia Mother     Bipolar disorder Brother     Bipolar disorder Maternal Aunt     Heart attack Maternal Uncle     Drug abuse Paternal Aunt     Stomach cancer Maternal Grandmother        Past Surgical History:  Past Surgical History:   Procedure Laterality Date    MOUTH  SURGERY      TOE AMPUTATION      PT. WAS BORN WITH EXTRA TOE       Problem List:  Patient Active Problem List   Diagnosis    Depression    Anxiety    Bipolar disorder    Insomnia    Class 1 obesity due to excess calories without serious comorbidity with body mass index (BMI) of 33.0 to 33.9 in adult    Weight gain    Iron deficiency    Fatigue    Large breasts    Thoracolumbar back pain       Allergy:   No Known Allergies     Current Medications:   Current Outpatient Medications   Medication Sig Dispense Refill    citalopram (CeleXA) 40 MG tablet Take 1 tablet by mouth Daily. 30 tablet 0    propranolol (INDERAL) 10 MG tablet Take 1 tablet by mouth Daily As Needed (anxiety). for anxiety 30 tablet 0    lamoTRIgine (LaMICtal) 100 MG tablet Take 2 tablets by mouth Daily. 60 tablet 0    vitamin D (ERGOCALCIFEROL) 1.25 MG (32131 UT) capsule capsule TAKE 1 CAPSULE BY MOUTH ONCE WEEKLY 12 capsule 1     No current facility-administered medications for this visit.       Review of Systems:    Review of Systems   Constitutional: Negative.    HENT: Negative.     Eyes: Negative.    Respiratory: Negative.     Cardiovascular: Negative.    Gastrointestinal: Negative.    Endocrine: Negative.    Genitourinary: Negative.    Musculoskeletal:  Positive for back pain.   Skin: Negative.    Allergic/Immunologic: Negative.    Neurological: Negative.  Negative for seizures.   Hematological: Negative.    Psychiatric/Behavioral:  Positive for dysphoric mood and stress. The patient is nervous/anxious.          Physical Exam:   Physical Exam  Constitutional:       Appearance: Normal appearance.   HENT:      Head: Normocephalic.      Nose: Nose normal.   Pulmonary:      Effort: Pulmonary effort is normal.   Musculoskeletal:         General: Normal range of motion.      Cervical back: Normal range of motion.   Neurological:      Mental Status: She is alert.   Psychiatric:         Attention and Perception: Attention and perception normal.          Mood and Affect: Mood is anxious. Affect is flat.         Speech: Speech normal.         Behavior: Behavior normal. Behavior is cooperative.         Thought Content: Thought content normal.         Cognition and Memory: Cognition and memory normal.         Judgment: Judgment is impulsive.         Vitals:  not currently breastfeeding. There is no height or weight on file to calculate BMI.  Due to extenuating circumstances and possible current health risks associated with the patient being present in a clinical setting (with current health restrictions in place in regards to possible COVID 19 transmission/exposure), the patient was seen remotely today via a [employer] Video Visit through [facility].  Unable to obtain vital signs due to nature of remote visit.  Height stated at 5ft4.5 inches.  Weight stated at 160 pounds.    Last 3 Blood Pressure Readings:  BP Readings from Last 3 Encounters:   02/12/25 134/78   01/15/25 114/72   11/14/24 126/86       PHQ-9 Depression Screening  Little interest or pleasure in doing things? Several days   Feeling down, depressed, or hopeless? Several days   PHQ-2 Total Score 2   Trouble falling or staying asleep, or sleeping too much? Several days   Feeling tired or having little energy? Several days   Poor appetite or overeating? Several days   Feeling bad about yourself - or that you are a failure or have let yourself or your family down? Several days   Trouble concentrating on things, such as reading the newspaper or watching television? Several days   Moving or speaking so slowly that other people could have noticed? Or the opposite - being so fidgety or restless that you have been moving around a lot more than usual? Several days     Thoughts that you would be better off dead, or of hurting yourself in some way? Not at all   PHQ-9 Total Score 8   If you checked off any problems, how difficult have these problems made it for you to do your work, take care of things at home, or get along with  other people? Very difficult           ISIDRO-7 Score:   Feeling nervous, anxious or on edge: (Patient-Rptd) Several days  Not being able to stop or control worrying: (Patient-Rptd) More than half the days  Worrying too much about different things: (Patient-Rptd) More than half the days  Trouble Relaxing: (Patient-Rptd) Several days  Being so restless that it is hard to sit still: (Patient-Rptd) Not at all  Feeling afraid as if something awful might happen: (Patient-Rptd) Not at all  Becoming easily annoyed or irritable: (Patient-Rptd) Several days  ISIDRO 7 Total Score: (Patient-Rptd) 7  If you checked any problems, how difficult have these problems made it for you to do your work, take care of things at home, or get along with other people: (Patient-Rptd) Somewhat difficult     Mental Status Exam:   Hygiene:   good  Cooperation:  Cooperative  Eye Contact:  Good  Psychomotor Behavior:  Appropriate  Affect:  Full range  Mood: normal  Hopelessness: 2  Speech:  Normal  Thought Process:  Goal directed and Linear  Thought Content:  Normal  Suicidal:  None  Homicidal:  None  Hallucinations:  None  Delusion:  None  Memory:  Intact  Orientation:  Grossly intact  Reliability:  good  Insight:  Fair  Judgement:  Impaired  Impulse Control:  Impaired  Physical/Medical Issues:  No        Lab Results:   Admission on 02/12/2025, Discharged on 02/12/2025   Component Date Value Ref Range Status    SARS Antigen 02/12/2025 Not Detected  Not Detected, Presumptive Negative Final    Influenza A Antigen LIA 02/12/2025 Not Detected  Not Detected Final    Influenza B Antigen LIA 02/12/2025 Not Detected  Not Detected Final    Internal Control 02/12/2025 Passed  Passed Final    Lot Number 02/12/2025 [id number]   Final    Expiration Date 02/12/2025 11/21/2025   Final    Rapid Strep A Screen 02/12/2025 Negative   Final    Internal Control 02/12/2025 Passed   Final    Lot Number 02/12/2025 [id number]   Final    Expiration Date 02/12/2025 03/06/2027    Final         Assessment & Plan   Problems Addressed this Visit          Mental Health    Bipolar disorder - Primary    Relevant Medications    citalopram (CeleXA) 40 MG tablet    lamoTRIgine (LaMICtal) 100 MG tablet     Other Visit Diagnoses         Panic anxiety syndrome        Relevant Medications    citalopram (CeleXA) 40 MG tablet    propranolol (INDERAL) 10 MG tablet      Dysthymia  (Chronic)       Relevant Medications    citalopram (CeleXA) 40 MG tablet      Medication refill        Relevant Medications    citalopram (CeleXA) 40 MG tablet    propranolol (INDERAL) 10 MG tablet    lamoTRIgine (LaMICtal) 100 MG tablet          Diagnoses         Codes Comments      Bipolar affective disorder, currently depressed, moderate    -  Primary ICD-10-CM: F31.32  ICD-9-CM: 296.52       Panic anxiety syndrome     ICD-10-CM: F41.0  ICD-9-CM: 300.01       Dysthymia     ICD-10-CM: F34.1  ICD-9-CM: 300.4       Medication refill     ICD-10-CM: Z76.0  ICD-9-CM: V68.1             Visit Diagnoses:    ICD-10-CM ICD-9-CM   1. Bipolar affective disorder, currently depressed, moderate  F31.32 296.52   2. Panic anxiety syndrome  F41.0 300.01   3. Dysthymia  F34.1 300.4   4. Medication refill  Z76.0 V68.1         Celexa and propranolol refilled.  We will increase Lamictal to 200 mg immediate release daily.  Encouraged use of these medications currently as they have been therapeutic, patient states she does not plan to stop medications. Previously educated upon side effects with use of these medications as well as when to present for emergency services, denies at this time. Instructions sent regarding use of medications attached to visit of initial prescribing date.  Continue therapy.  Felt this provider in 4 weeks or sooner if needed.    Discussed need marijuana use with current medications could exacerbate mental health symptoms, leading to further depression or psychosis. There is also some research to support psychotropics and mood  stabilizers with marijuana use could reduce efficacy of medications. may increase side effects such as dizziness, drowsiness, confusion, and difficulty concentrating. Some people, especially the elderly, may also experience impairment in thinking, judgment, and motor coordination. Discussed with patient need to practice cessation, verbalized understanding.    Risks, benefits, alternatives discussed with patient including GI upset, nausea vomiting diarrhea, theoretical decrease of seizure threshold predisposing the patient to a slightly higher seizure risk, headaches, sexual dysfunction, serotonin syndrome, bleeding risk, increased suicidality in patients 24 years and younger, switching to rosey/hypomania.  After discussion of these risks and benefits, the patient voiced understanding and agreed to proceed.     Discussed with pt that combining ETOH with prescribed antidepressants can worsen depression in addition to causing drowsiness, nausea, dizziness, impaired motor control, and increasing risk of cardiovascular events. The combination has the potential to be fatal. Therefore, it is highly recommended that the patient avoid ETOH use while on psychotropic medications. This APRN offered pt resources for ETOH treatment and pt declined    GOALS:  Short Term Goals: Patient will be compliant with medication, and patient will have no significant medication related side effects.  Patient will be engaged in psychotherapy as indicated.  Patient will report subjective improvement of symptoms.  Long term goals: To stabilize mood and treat/improve subjective symptoms, the patient will stay out of the hospital, the patient will be at an optimal level of functioning, and the patient will take all medications as prescribed.  The patient/guardian verbalized understanding and agreement with goals that were mutually set.      TREATMENT PLAN: Continue supportive psychotherapy efforts and medications as indicated.  Pharmacological and  Non-Pharmacological treatment options discussed during today's visit. Patient/Guardian acknowledged and verbally consented with current treatment plan and was educated on the importance of compliance with treatment and follow-up appointments.      MEDICATION ISSUES:  Discussed medication options and treatment plan of prescribed medication as well as the risks, benefits, any black box warnings, and side effects including potential falls, possible impaired driving, and metabolic adversities among others. Patient is agreeable to call the office with any worsening of symptoms or onset of side effects, or if any concerns or questions arise.  The contact information for the office is made available to the patient. Patient is agreeable to call 911 or go to the nearest ER should they begin having any SI/HI, or if any urgent concerns arise. No medication side effects or related complaints today.     MEDS ORDERED DURING VISIT:  New Medications Ordered This Visit   Medications    citalopram (CeleXA) 40 MG tablet     Sig: Take 1 tablet by mouth Daily.     Dispense:  30 tablet     Refill:  0    propranolol (INDERAL) 10 MG tablet     Sig: Take 1 tablet by mouth Daily As Needed (anxiety). for anxiety     Dispense:  30 tablet     Refill:  0    lamoTRIgine (LaMICtal) 100 MG tablet     Sig: Take 2 tablets by mouth Daily.     Dispense:  60 tablet     Refill:  0       Follow Up Appointment:   Return in about 4 weeks (around 6/30/2025) for Recheck.             This document has been electronically signed by RIVER Casper  June 2, 2025 09:29 EDT    Some of the data in this electronic note has been brought forward from a previous encounter, any necessary changes have been made, it has been reviewed by this APRN, and it is accurate.      Dictated Utilizing Dragon Dictation: Part of this note may be an electronic transcription/translation of spoken language to printed text using the Dragon Dictation System.

## 2025-06-09 ENCOUNTER — OFFICE VISIT (OUTPATIENT)
Dept: OBSTETRICS AND GYNECOLOGY | Facility: CLINIC | Age: 25
End: 2025-06-09
Payer: COMMERCIAL

## 2025-06-09 VITALS
WEIGHT: 205 LBS | HEIGHT: 65 IN | SYSTOLIC BLOOD PRESSURE: 116 MMHG | DIASTOLIC BLOOD PRESSURE: 80 MMHG | BODY MASS INDEX: 34.16 KG/M2

## 2025-06-09 DIAGNOSIS — Z01.419 PAP TEST, AS PART OF ROUTINE GYNECOLOGICAL EXAMINATION: Primary | ICD-10-CM

## 2025-06-09 DIAGNOSIS — N91.4 SECONDARY OLIGOMENORRHEA: ICD-10-CM

## 2025-06-09 DIAGNOSIS — Z11.3 ROUTINE SCREENING FOR STI (SEXUALLY TRANSMITTED INFECTION): ICD-10-CM

## 2025-06-09 PROCEDURE — 99213 OFFICE O/P EST LOW 20 MIN: CPT

## 2025-06-09 PROCEDURE — 99459 PELVIC EXAMINATION: CPT

## 2025-06-09 PROCEDURE — 99395 PREV VISIT EST AGE 18-39: CPT

## 2025-06-09 NOTE — PROGRESS NOTES
Subjective   Chief Complaint   Patient presents with    Gynecologic Exam     New GYN, establish care.      Menstrual Problem     Irregular menses.       Saba Brown is a 24 y.o. year old  presenting to be seen for her annual exam and irregular menses. Of note, she is in the process of planning a breast reduction due to back pain and has had a consult with Plastic surgery. 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     SEXUAL Hx:  She is currently sexually active.  In the past year there there has been NO new sexual partners.    Condoms are never used.  She would like to be screened for STD's at today's exam.  Current birth control method: not needed because she is in a same sex relationship.  She is happy with her current method of contraception and does not want to discuss alternative methods of contraception.  MENSTRUAL Hx:  Patient's last menstrual period was 2025 (approximate).  In the past 6 months her cycles have been irregular skipping every other month. Her menstrual flow is typically moderately heavy changing every 3-4 hours at the most.   Each month on average there are roughly 0 day(s) of very heavy flow.    Intermenstrual bleeding is present unsure if it is vaginal spotting to rectal bleeding from straining to have a bowel movement.    Post-coital bleeding is absent.  Dysmenorrhea: none and is not affecting her activities of daily living. Previously had moderate to severe cramping that affected daily living.  PMS: moderate and is not affecting her activities of daily living  Her cycles ARE a source of concern for her that she wishes to discuss today.  HEALTH Hx:  She exercises regularly: no (but is planning to start exercising more).  She wears her seat belt: yes.  She has concerns about domestic violence: no.  OTHER THINGS SHE WANTS TO DISCUSS TODAY:  Nothing else    The following portions of the patient's history were reviewed and updated as appropriate:problem list, current  "medications, allergies, past family history, past medical history, past social history, and past surgical history.    Social History    Tobacco Use      Smoking status: Never        Passive exposure: Never      Smokeless tobacco: Never      Review of Systems   Constitutional:  Positive for activity change (decreased physical activity) and unexpected weight change (gained 80lbs in the past year).   Respiratory: Negative.  Negative for chest tightness and shortness of breath.    Cardiovascular: Negative.  Negative for chest pain.   Gastrointestinal: Negative.  Negative for constipation and diarrhea.        Unsure if vaginal or rectal bleeding from straining     Endocrine:        + hirsutism on chest    Genitourinary:  Positive for menstrual problem (oligomenorrhea) and vaginal bleeding (possible intermenstrual spotting). Negative for difficulty urinating, dysuria, frequency, genital sores, pelvic pain, urgency, vaginal discharge and vaginal pain.   Skin: Negative.    Psychiatric/Behavioral:  Positive for dysphoric mood (bipolar disorder, diagnosed and on medications).           Objective   /80   Ht 163.8 cm (64.5\")   Wt 93 kg (205 lb)   LMP 04/20/2025 (Approximate)   Breastfeeding No   BMI 34.64 kg/m²     Physical Exam  Vitals and nursing note reviewed. Exam conducted with a chaperone present.   Constitutional:       Appearance: Normal appearance. She is not ill-appearing.   HENT:      Head: Normocephalic and atraumatic.   Eyes:      General: No scleral icterus.  Neck:      Comments: No thyroid abnormalities felt    Pulmonary:      Effort: Pulmonary effort is normal. No respiratory distress.   Chest:   Breasts:     Breasts are symmetrical.      Right: Normal.      Left: Normal.   Abdominal:      General: There is no distension.      Palpations: Abdomen is soft. There is no mass.      Tenderness: There is no abdominal tenderness. There is no guarding.      Hernia: No hernia is present.   Genitourinary:     " General: Normal vulva.      Exam position: Lithotomy position.      Labia:         Right: No rash, tenderness or lesion.         Left: No rash, tenderness or lesion.       Urethra: No urethral lesion.      Vagina: Normal. No vaginal discharge, erythema, tenderness, bleeding, lesions or prolapsed vaginal walls.      Cervix: Cervical bleeding present.      Uterus: Normal.       Adnexa: Right adnexa normal and left adnexa normal.      Rectum: Normal. No external hemorrhoid.      Comments: Digital rectal exam deferred   Scant amount of bright red bleeding from cervical os - possibly onset of menses  One swab for STI testing today per patient request     Musculoskeletal:         General: No swelling. Normal range of motion.      Cervical back: Normal range of motion.      Right lower leg: No edema.      Left lower leg: No edema.   Lymphadenopathy:      Upper Body:      Right upper body: No supraclavicular, axillary or pectoral adenopathy.      Left upper body: No supraclavicular, axillary or pectoral adenopathy.   Skin:     General: Skin is warm and dry.   Neurological:      General: No focal deficit present.      Mental Status: She is alert and oriented to person, place, and time.   Psychiatric:         Mood and Affect: Mood normal.         Behavior: Behavior normal.         Thought Content: Thought content normal.         Judgment: Judgment normal.            Assessment & Plan  Pap test, as part of routine gynecological examination  Patient has not had pap smear previously. UTD on other screening tests.   Pap was done today.  If she does not receive the results of the Pap within 2 weeks  time, she was instructed to call to find out the results.  I explained to Saba that the recommendations for Pap smear interval in a low risk patient's has lengthened to 3 years time.  I encouraged her to be seen yearly for a full physical exam including breast and pelvic exam even during the off years when PAP's will not be  performed.    Orders:    LIQUID-BASED PAP SMEAR WITH HPV GENOTYPING IF ASCUS (ISA,COR,MAD)    Secondary oligomenorrhea  Discussed that this coupled with hirsutism is most likely indicative of PCOS. Discussed [place] criteria for diagnosis of PCOS. Patient would like further laboratory work up - labs sent to [facility] per her request. Discussed possibility of TVUS to further support diagnosis and evaluate for endometrial hyperplasia (will reiterate screening TVUS when calling with results). Discussed the COCs are the mainstay of management for PCOS - patient would like to wait until further workup complete before starting medication.   - Emphasized importance of calling clinic if she goes 3 months w/o a menstrual period.   - Recommend calling if she continues to have irregular spotting and is still unable to determine if it is rectal or vaginal.     Orders:    TSH Rfx On Abnormal To Free T4; Future    Prolactin; Future    TestT+TestF+SHBG; Future    17-Hydroxyprogesterone; Future    Follicle Stimulating Hormone; Future    Routine screening for STI (sexually transmitted infection)  Per patient request, asymptomatic.   Orders:    HIV-1 / O / 2 Ag / Antibody; Future    Hepatitis B Surface Antigen; Future    Hepatitis C Antibody; Future    RPR Qualitative with Reflex to Quant; Future    OneSwab - Swab, Cervix, Endocervix, Vagina; Future      No prescription was given or electronically sent at today's visit    The importance of keeping all planned follow-up and taking all medications as prescribed was emphasized.    Today I discussed with Saba the total recommended calcium intake for a premenopausal female is 1000 mg.  Ideally this should be from dietary sources.  I reviewed calcium content in various foods including milk, fortified orange juice and yogurt.  If she cannot get sufficient calcium through dietary means, it is recommended to supplement with either a multivitamin or calcium to reach her daily goal.   I also reviewed the difference in the bioavailability of calcium carbonate and calcium citrate containing supplements and the importance of taking calcium carbonate containing products with food.  Finally, vitamin D's role in calcium absorption was reviewed and a total daily vitamin D intake of 800 units was recommended.    I discussed with Saba that she may be behind on needed vaccinations for HPV (Gardasil-9), TDAP, and COVID booster / COVID bivalent booster.  She may be able to obtain these vaccinations at her local pharmacy OR speak about obtaining them with her primary care.  If she does obtain her vaccines, I have asked Saba to let us know the date each vaccine was obtained so that her medical record could be updated in our system.    No orders of the defined types were placed in this encounter.           Return in about 1 year (around 6/9/2026) for Annual physical.    Anne Marie Gallegos, APRN  June 9, 2025

## 2025-06-10 LAB — REF LAB TEST METHOD: NORMAL

## 2025-06-12 ENCOUNTER — PATIENT ROUNDING (BHMG ONLY) (OUTPATIENT)
Dept: OBSTETRICS AND GYNECOLOGY | Facility: CLINIC | Age: 25
End: 2025-06-12
Payer: COMMERCIAL

## 2025-06-12 NOTE — PROGRESS NOTES
My name is BRYANNA Silva    I am with YOEL MODI  [facility]  [facility]  [place] [address] [phone]    I want to officially welcome you to our practice and ask about your recent visit.    Tell me about your visit with us.  What things went well?    We're always looking for ways to make our patients' experiences even better.  Do you have recommendations on ways we may improve?    Overall were you satisfied with your first visit to our practice?    I appreciate you taking the time to answer a few questions today.  Is there anything else I can do for you?    Thank you, and have a great day.

## 2025-06-17 ENCOUNTER — TELEPHONE (OUTPATIENT)
Dept: OBSTETRICS AND GYNECOLOGY | Facility: CLINIC | Age: 25
End: 2025-06-17
Payer: COMMERCIAL

## 2025-06-17 NOTE — TELEPHONE ENCOUNTER
Called patient to discuss lab results (still waiting on 17-OHP). Elevation in testosterone and symptoms is indicative of PCOS. Discussed baseline ultrasound (especially since oligomenorrhea) - patient would like to think about this. She is not interested in OCP currently. Discussed the importance of calling the clinic if she does not have a period next month (~3 months from last true cycle). Discussed that the reasoning is to protect endometrial lining from hyperplasia. Patient is currently off and on spotting, but not having a true cycle.

## 2025-06-23 ENCOUNTER — TELEMEDICINE (OUTPATIENT)
Dept: PSYCHIATRY | Facility: CLINIC | Age: 25
End: 2025-06-23
Payer: COMMERCIAL

## 2025-06-23 DIAGNOSIS — F31.32 BIPOLAR AFFECTIVE DISORDER, CURRENTLY DEPRESSED, MODERATE: Primary | ICD-10-CM

## 2025-06-23 DIAGNOSIS — F41.1 GENERALIZED ANXIETY DISORDER: ICD-10-CM

## 2025-06-23 PROCEDURE — 90834 PSYTX W PT 45 MINUTES: CPT | Performed by: SOCIAL WORKER

## 2025-06-23 NOTE — PLAN OF CARE
Patient was engaged in reviewing treatment goals. Patient has decrease behavior such as drinking and spending money to cope significantly. Patient will continue to work on establishing routines that support healthy behaviors such as exercising, eating healthy diet, and maintaining patient's obligations.   Problem: Mood Instability 5  Goal: Patient will achieve mood stability as evidenced by controlled behavior and a more deliberate thought process  Outcome: Progressing

## 2025-06-23 NOTE — TREATMENT PLAN
"Multi-Disciplinary Problems (from Behavioral Health Treatment Plan)      Active Problems       Problem: Mood Instability  Start Date: 06/23/25      Problem Details: The patient self-scales this problem as a 5 with 10 being the worst. Patient will establish routines around healthy behaviors that support \"a clean life style.\"            Goal Priority Start Date Expected End Date End Date    Patient will achieve mood stability as evidenced by controlled behavior and a more deliberate thought process -- 06/23/25 12/22/25 --    Goal Details: Progress toward goal:  The patient self-scales their progress related to this goal as a 5 with 10 being the worst.        Goal Intervention Frequency Start Date End Date    Provide structure and focus to patient's thoughts and actions by establishing plans and routine. Q4 Weeks 06/23/25 --    Intervention Details: Duration of treatment until discharged.        Goal Intervention Frequency Start Date End Date    Assist patient in setting responsible goals and limits in behavior. Q4 Weeks 06/23/25 --    Intervention Details: Duration of treatment until discharged.                        Reviewed By       Jayme Hennessy, [facility] 06/23/25 0761                     I have discussed and reviewed this treatment plan with the patient.   "

## 2025-06-23 NOTE — PROGRESS NOTES
"[facility]   Follow-up Progress Note     Date: June 23, 2025  Time In: 2:34  Time Out: 3:21      PROGRESS NOTE  Data:  Saba Brown is a 24 y.o. female presenting to [facility] (through [facility]), [address], [place], [address] using a secure MyChart Video Visit through [facility] for assessment with Jayme Hennessy LCSW. The patient is seen remotely in their home using [facility] My Chart. Patient is being seen via telehealth and stated they are in a secure environment for this session. The patient's condition being diagnosed/treated is appropriate for telemedicine. The provider identified herself as well as her credentials. The patient and/or patients guardian consent to be seen remotely, and when consent is given they understand that the consent allows for patient identifiable information to be sent to a third party as needed. They may refuse to be seen remotely at any time. The electronic data is encrypted and password protected, and the patient has been advised of the potential risks to privacy not withstanding such measures.    Today Patient stated she is working to transition to the [facility]. Patient stated she is waiting for her drug test and she will be able to start work on the 7th. Patient stated she is staying with her girlfriend right now but does plan to get her own apartment. Patient stated she did inform her parents about her move. Patient stated she does not feel they support patient's decision but haven't attempted to interfere or change patient's mind. Patient stated she does have some guilt and wants her mother's approval. Patient recognized that getting positive feedback \"feels that void for me\" from patient's mother. Processed with patient seeking her mother's approval. Patient stated her mother has \"drilled\" into her that her opinion is \"right.\" Discussed with patient changing her " perspective around what is right. Patient was engaged in reviewing treatment progress.       Clinical Maneuvering/Intervention:    Chief Complaint: mood instability, anxiety    (Scales based on 0 - 10 with 10 being the worst)  Depression: 0 Anxiety: 0     Assisted Patient in processing above session content; acknowledged and normalized patient’s thoughts, feelings, and concerns.  Rationalized patient thought process regarding seeking her mother's approval.  Discussed triggers associated with patient's anxiety.  Also discussed coping skills for patient to implement such as changing patient's perspective.    Allowed Patient to freely discuss issues  without interruption or judgement with unconditional positive regard, active listening skills, and empathy. Therapist provided a safe, confidential environment to facilitate the development of a positive therapeutic relationship and encouraged open, honest communication. Assisted Patient in identifying risk factors which would indicate the need for higher level of care including thoughts to harm self or others and/or self-harming behavior and encouraged Patient to contact this office, call 911, or present to the nearest emergency room should any of these events occur. Discussed crisis intervention services and means to access. Patient adamantly and convincingly denies current suicidal or homicidal ideation or perceptual disturbance. Assisted Patient in processing session content; acknowledged and normalized Patient’s thoughts, feelings, and concerns by utilizing a person-centered approach in efforts to build appropriate rapport and a positive therapeutic relationship with open and honest communication. Therapist utilized dialectical behavior techniques to teach and model emotional regulation and relaxation methods. Therapist assisted Patient with identifying and implementing healthier coping strategies.     Assessment   Patient appears to be experiencing heightened anxiety  and depression in response to COVID-19 epidemic  As a result, they can be reasonably expected to continue to benefit from treatment and would likely be at increased risk for decompensation otherwise.    Mental Status Exam:   Hygiene:   good  Cooperation:  Cooperative  Eye Contact:  Good  Psychomotor Behavior:  Appropriate  Affect:  Full range  Mood: happy  Speech:  Normal  Thought Process:  Goal directed  Thought Content:  Mood congruent  Suicidal:  None  Homicidal:  None  Hallucinations:  None  Delusion:  None  Memory:  Intact  Orientation:  Person, Place, Time, and Situation  Reliability:  good  Insight:  Good  Judgement:  Good  Impulse Control:  Good  Physical/Medical Issues:  No      PHQ-Score Total:  PHQ-9 Total Score:        Patient's Support Network Includes:  significant other    Functional Status: Moderate impairment     Progress toward goal: Not at goal    Prognosis: Good with Ongoing Treatment            Impression/Formulation:    VISIT DIAGNOSIS:     ICD-10-CM ICD-9-CM   1. Bipolar affective disorder, currently depressed, moderate  F31.32 296.52   2. Generalized anxiety disorder  F41.1 300.02        Patient appeared alert and oriented.  Patient is voluntarily requesting to continue outpatient therapy at [facility].  Patient is receptive to assistance with maintaining a stable lifestyle.  Patient presents with history of mood stability and anxiety.  Patient is agreeable to attend routine therapy sessions.  Patient expressed desire to maintain stability and participate in the therapeutic process.        Crisis Plan:  Symptoms and/or behaviors to indicate a crisis: Prolonged irritability or anger    What calming techniques or other strategies will patient use to de-esclate and stay safe: slow down, breathe, visualize calming self, think it though, listen to music, change focus, take a walk    Who is one person patient can contact to assist with de-escalation?  girlfriend    If symptoms/behaviors persist, Patient will present to the nearest hospital for an assessment. Advised patient of [facility] and assessment services.     Plan:   Patient will continue in individual outpatient therapy with focus on improved functioning and coping skills, maintaining stability, and avoiding decompensation and the need for higher level of care.    Patient will contact this office ([facility] at [phone]), call 911 or present to the nearest emergency room should suicidal or homicidal ideations occur. Provide Cognitive Behavioral Therapy and Solution Focused Therapy to improve functioning, maintain stability, and avoid decompensation and the need for higher level of care.     Return in about 4 weeks, or earlier if symptoms worsen or fail to improve.    Recommended Referrals: none        This document has been electronically signed by Jayme Hennessy LCSW  June 23, 2025 14:34 EDT        Part of this note may be an electronic transcription/translation of spoken language to printed text using the Dragon Dictation System.    Mode of Visit: Video  Location of patient: -HOME-  Location of provider: +HOME+  You have chosen to receive care through a telehealth visit.  The patient has signed the video visit consent form.  The visit included audio and video interaction. No technical issues occurred during this visit.

## 2025-06-30 DIAGNOSIS — Z76.0 MEDICATION REFILL: ICD-10-CM

## 2025-06-30 DIAGNOSIS — F41.0 PANIC ANXIETY SYNDROME: ICD-10-CM

## 2025-06-30 RX ORDER — PROPRANOLOL HYDROCHLORIDE 10 MG/1
10 TABLET ORAL DAILY PRN
Qty: 30 TABLET | Refills: 0 | Status: SHIPPED | OUTPATIENT
Start: 2025-06-30 | End: 2025-07-02 | Stop reason: SDUPTHER

## 2025-07-02 ENCOUNTER — TELEMEDICINE (OUTPATIENT)
Dept: PSYCHIATRY | Facility: CLINIC | Age: 25
End: 2025-07-02
Payer: COMMERCIAL

## 2025-07-02 DIAGNOSIS — F34.1 DYSTHYMIA: Chronic | ICD-10-CM

## 2025-07-02 DIAGNOSIS — F31.32 BIPOLAR AFFECTIVE DISORDER, CURRENTLY DEPRESSED, MODERATE: Primary | ICD-10-CM

## 2025-07-02 DIAGNOSIS — Z76.0 MEDICATION REFILL: ICD-10-CM

## 2025-07-02 DIAGNOSIS — F41.0 PANIC ANXIETY SYNDROME: ICD-10-CM

## 2025-07-02 RX ORDER — LAMOTRIGINE 100 MG/1
200 TABLET ORAL DAILY
Qty: 60 TABLET | Refills: 1 | Status: SHIPPED | OUTPATIENT
Start: 2025-07-02

## 2025-07-02 RX ORDER — CITALOPRAM HYDROBROMIDE 40 MG/1
40 TABLET ORAL DAILY
Qty: 30 TABLET | Refills: 1 | Status: SHIPPED | OUTPATIENT
Start: 2025-07-02

## 2025-07-02 RX ORDER — PROPRANOLOL HYDROCHLORIDE 10 MG/1
10 TABLET ORAL DAILY PRN
Qty: 30 TABLET | Refills: 0 | Status: SHIPPED | OUTPATIENT
Start: 2025-07-02

## 2025-07-02 NOTE — LETTER
July 2, 2025    Saba Brown  [address] [address]        [employer]  [email or web address]  [phone]  Customer Support Hours  Monday - Friday  8:00AM - 7:00PM CST  Email   [email or web address]    [employer]ets  [email or web address]     Service Animals  [email or web address]  [phone]                Dinah Mercedes, APRN

## 2025-07-02 NOTE — LETTER
[facility]  [address] [place] [address]  [phone]  Dept: [phone]    25    RE:   Saba Brown   :  2000    To Whom It May Concern:    This letter is to verify that Saba is a patient at [facility] and is released to return to work 25  with no restrictions.    If you have any questions please feel free to contact me at [phone].      Sincerely,     Dinah Mercedes PMHNP-APRN

## 2025-07-02 NOTE — PROGRESS NOTES
This provider is located at the [facility] (through [facility]), [address], [facility], [address] using a secure MyChart Video Visit through [employer]. Patient is being seen remotely via telehealth at their home address in [place], and stated they are in a secure environment for this session. The patient's condition being diagnosed/treated is appropriate for telemedicine. The provider identified herself as well as her credentials.   The patient, and/or patients guardian, consent to be seen remotely, and when consent is given they understand that the consent allows for patient identifiable information to be sent to a third party as needed.   They may refuse to be seen remotely at any time. The electronic data is encrypted and password protected, and the patient and/or guardian has been advised of the potential risks to privacy not withstanding such measures.    You have chosen to receive care through a telehealth visit.  Do you consent to use a video/audio connection for your medical care today? Yes    Patient identifiers utilized: Name and date of birth.    Patient verbally confirmed consent for today's encounter:  July 2, 2025    Levi Brown is a 24 y.o. female who presents today for follow up    Chief Complaint:    Chief Complaint   Patient presents with    Anxiety    Depression    Med Management        Referring Provider: Jayme Hennessy LCSW    History of Present Illness:    History of Present Illness  Patient is a 24-year-old female presenting for a follow-up related to dysthymia, irritability, depression and anxiety as well as sleep disturbances.  Pained on Lamictal 100 mg due to inability to receive previous dosage sent to pharmacy.  Otherwise has been compliant with medications, denies side effects.  PHQ reduced May to 3, none for depression as patient rates a 0/10 on average.  ISIDRO increase in 7-8, again mild anxiety with patient was a 2/10 on  "average \"I am good.\"  Regarding utilization of propranolol \"I have not used, I probably needed it worked pretty good.\"  Sleep disrupted \"for hours last night, my brain likes to fight sleep, not sleeping that well.\"  States that she has \"felt better emotionally since not been gone.\"  Recent move away from parents has been positive according to patient.  Regarding her appetite \"the most part eating less.\"  Denies SI, HI, SIB, or hallucinations currently and is convincing.  Denies symptoms consistent with rosey.  Medically she has had a follow-up with her gynecologist, exploring PCOS.  Requests animal service letter for her cat, also work note.    Patient resides with girlfriend in an apartment. 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  Graduated from college at [employer] with psychology degree, also played basketball there. 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  Cites her parents remain together, relationship with them is improving.  Good relationships with her brother and her sister.  She is single, no children.   Denies previous arrests.  Quaker Taoism preference.  Enjoys playing basketball and playing Xbox.  States frequent marijuana use \"not as bad and was drinking 3-5 shots of vodka every other day, backed off a few days ago. Currently in therapy.         Last Menstrual Period:  \"Couple days ago\"-irregular    The patient denies any chance of pregnancy at this time.  The patient was educated that her prescribed medications can have potential risk to a developing fetus. The patient is advised to contact this APRN/this office if she becomes pregnant or plans to become pregnant.  Pt verbalizes understanding and acknowledged agreement with this plan in her own words.      The following portions of the patient's history were reviewed and updated as appropriate: allergies, current medications, past family history, past medical " history, past social history, past surgical history and problem list.    Past Psychiatric History:  Began Treatment:sophomore year  Diagnoses:Anxiety  Psychiatrist:Chon  Therapist:once a month  Admission History:Denies  Medication Trials:zoloft (I didn't eat on those and gained weight), seroquel (groggy), atarax (vomiting), trazodone (25 makes drowsy)  Self Harm: Denies  Suicide Attempts:Denies   Psychosis, Anxiety, Depression: Denies    Past Medical History:  Past Medical History:   Diagnosis Date    Anxiety     Bipolar 2 disorder     Depression     Insomnia        Substance Abuse History:   Types:Denies all, including illicit  Withdrawal Symptoms:Denies  Longest Period Sober:Not Applicable   AA: Not applicable     Social History:  Social History     Socioeconomic History    Marital status: Single    Number of children: 0   Tobacco Use    Smoking status: Never     Passive exposure: Never    Smokeless tobacco: Never   Vaping Use    Vaping status: Former    Substances: Nicotine    Devices: Disposable    Passive vaping exposure: Yes   Substance and Sexual Activity    Alcohol use: Yes     Alcohol/week: 3.0 standard drinks of alcohol     Types: 3 Shots of liquor per week     Comment: SOC    Drug use: Yes     Types: Marijuana     Comment: occasional    Sexual activity: Yes     Partners: Female     Birth control/protection: None, Partner of same sex       Family History:  Family History   Problem Relation Age of Onset    Hypertension Mother     Hyperlipidemia Mother     Bipolar disorder Brother     Bipolar disorder Maternal Aunt     Heart attack Maternal Uncle     Drug abuse Paternal Aunt     Stomach cancer Maternal Grandmother        Past Surgical History:  Past Surgical History:   Procedure Laterality Date    MOUTH SURGERY      TOE AMPUTATION      PT. WAS BORN WITH EXTRA TOE       Problem List:  Patient Active Problem List   Diagnosis    Depression    Anxiety    Bipolar disorder    Insomnia    Class 1 obesity  due to excess calories without serious comorbidity with body mass index (BMI) of 33.0 to 33.9 in adult    Weight gain    Iron deficiency    Fatigue    Large breasts    Thoracolumbar back pain       Allergy:   No Known Allergies     Current Medications:   Current Outpatient Medications   Medication Sig Dispense Refill    citalopram (CeleXA) 40 MG tablet Take 1 tablet by mouth Daily. 30 tablet 1    lamoTRIgine (LaMICtal) 100 MG tablet Take 2 tablets by mouth Daily. 60 tablet 1    propranolol (INDERAL) 10 MG tablet Take 1 tablet by mouth Daily As Needed (anxiety). for anxiety 30 tablet 0    vitamin D (ERGOCALCIFEROL) 1.25 MG (64947 UT) capsule capsule TAKE 1 CAPSULE BY MOUTH ONCE WEEKLY 12 capsule 1     No current facility-administered medications for this visit.       Review of Systems:    Review of Systems   Constitutional: Negative.    HENT: Negative.     Eyes: Negative.    Respiratory: Negative.     Cardiovascular: Negative.    Gastrointestinal: Negative.    Endocrine: Negative.    Genitourinary: Negative.    Musculoskeletal:  Positive for back pain.   Skin: Negative.    Allergic/Immunologic: Negative.    Neurological: Negative.  Negative for seizures.   Hematological: Negative.    Psychiatric/Behavioral:  Positive for dysphoric mood and stress. The patient is nervous/anxious.          Physical Exam:   Physical Exam  Constitutional:       Appearance: Normal appearance.   HENT:      Head: Normocephalic.      Nose: Nose normal.   Pulmonary:      Effort: Pulmonary effort is normal.   Musculoskeletal:         General: Normal range of motion.      Cervical back: Normal range of motion.   Neurological:      Mental Status: She is alert.   Psychiatric:         Attention and Perception: Attention and perception normal.         Mood and Affect: Mood is anxious. Affect is flat.         Speech: Speech normal.         Behavior: Behavior normal. Behavior is cooperative.         Thought Content: Thought content normal.          Cognition and Memory: Cognition and memory normal.         Judgment: Judgment is impulsive.         Vitals:  Last menstrual period 04/20/2025, not currently breastfeeding. There is no height or weight on file to calculate BMI.  Due to extenuating circumstances and possible current health risks associated with the patient being present in a clinical setting (with current health restrictions in place in regards to possible COVID 19 transmission/exposure), the patient was seen remotely today via a MyChart Video Visit through [facility].  Unable to obtain vital signs due to nature of remote visit.  Height stated at 5ft4.5 inches.  Weight stated at 160 pounds.    Last 3 Blood Pressure Readings:  BP Readings from Last 3 Encounters:   06/09/25 116/80   02/12/25 134/78   01/15/25 114/72       PHQ-9 Depression Screening  Little interest or pleasure in doing things? Not at all   Feeling down, depressed, or hopeless? Not at all   PHQ-2 Total Score 0   Trouble falling or staying asleep, or sleeping too much? Several days   Feeling tired or having little energy? Not at all   Poor appetite or overeating? Not at all   Feeling bad about yourself - or that you are a failure or have let yourself or your family down? Several days   Trouble concentrating on things, such as reading the newspaper or watching television? Several days   Moving or speaking so slowly that other people could have noticed? Or the opposite - being so fidgety or restless that you have been moving around a lot more than usual? Not at all     Thoughts that you would be better off dead, or of hurting yourself in some way? Not at all   PHQ-9 Total Score 3   If you checked off any problems, how difficult have these problems made it for you to do your work, take care of things at home, or get along with other people? Somewhat difficult           ISIDRO-7 Score:   Feeling nervous, anxious or on edge: (Patient-Rptd) Several days  Not being able to stop or control worrying:  (Patient-Rptd) Several days  Worrying too much about different things: (Patient-Rptd) Several days  Trouble Relaxing: (Patient-Rptd) More than half the days  Being so restless that it is hard to sit still: (Patient-Rptd) More than half the days  Feeling afraid as if something awful might happen: (Patient-Rptd) Not at all  Becoming easily annoyed or irritable: (Patient-Rptd) Several days  ISIDRO 7 Total Score: (Patient-Rptd) 8  If you checked any problems, how difficult have these problems made it for you to do your work, take care of things at home, or get along with other people: (Patient-Rptd) Somewhat difficult     Mental Status Exam:   Hygiene:   good  Cooperation:  Cooperative  Eye Contact:  Good  Psychomotor Behavior:  Appropriate  Affect:  Full range  Mood: normal  Hopelessness: Denies  Speech:  Normal  Thought Process:  Goal directed and Linear  Thought Content:  Normal  Suicidal:  None  Homicidal:  None  Hallucinations:  None  Delusion:  None  Memory:  Intact  Orientation:  Grossly intact  Reliability:  good  Insight:  Fair  Judgement:  Impaired  Impulse Control:  Impaired  Physical/Medical Issues:  No        Lab Results:   Office Visit on 2025   Component Date Value Ref Range Status    Reference Lab Report 2025    Final                    Value:[facility]  [address] [place] [address]  Phone: [phone] or [phone]  Fax: [phone]  Diony Jefferson M.D., Medical Director    PATIENT NAME                                     LABORATORY NO.  [id number]   OSWALD GUSTAFSON                                   [id number]  [id number]                                 AGE                    SEX   SSN              CLIENT REF #  [facility] [place] [id number]        2000      F     [id number]      [id number]    ROSANNE HOLMAN                         REQUESTING MRUPAL.           ATTENDING M.D.         COPY TO.  [address] [address]           [place] [address]                        DATE COLLECTED            DATE RECEIVED          DATE REPORTED  06/09/2025                06/09/2025             06/10/2025    ThinPrep Pap with [facility]    DIAGNOSIS:  Negative for intraepithelial lesion or malignancy    Multiple factors can influence                           accuracy of Pap tests; therefore, screening at  regular intervals is necessary for early cancer detection.      SPECIMEN ADEQUACY:  SATISFACTORY FOR EVALUATION  Transformation zone is present.  Partially obscuring blood is present.  Sparse cellularity, minimal number of squamous cells available for evaluation.  SOURCE OF SPECIMEN:       CERVICAL/ENDOCERVICAL  SLIDES:  1  CLINICAL HISTORY:  Pap test, as part of routine gynecological examination  Irregular menses      CYTOTECHNOLOGIST:             CORNELIA CHRISTIAN (ASCP)    CPT CODES:  60650     Admission on 02/12/2025, Discharged on 02/12/2025   Component Date Value Ref Range Status    SARS Antigen 02/12/2025 Not Detected  Not Detected, Presumptive Negative Final    Influenza A Antigen LIA 02/12/2025 Not Detected  Not Detected Final    Influenza B Antigen LIA 02/12/2025 Not Detected  Not Detected Final    Internal Control 02/12/2025 Passed  Passed Final    Lot Number 02/12/2025 [id number]   Final    Expiration Date 02/12/2025 11/21/2025   Final    Rapid Strep A Screen 02/12/2025 Negative   Final    Internal Control 02/12/2025 Passed   Final    Lot Number 02/12/2025 [id number]   Final    Expiration Date 02/12/2025 03/06/2027   Final         Assessment & Plan   Problems Addressed this Visit          Mental Health    Bipolar disorder - Primary    Relevant Medications    citalopram (CeleXA) 40 MG tablet    lamoTRIgine (LaMICtal) 100 MG tablet     Other Visit Diagnoses         Panic anxiety syndrome        Relevant Medications    citalopram (CeleXA) 40 MG tablet    propranolol (INDERAL) 10 MG tablet      Dysthymia  (Chronic)        Relevant Medications    citalopram (CeleXA) 40 MG tablet      Medication refill        Relevant Medications    citalopram (CeleXA) 40 MG tablet    lamoTRIgine (LaMICtal) 100 MG tablet    propranolol (INDERAL) 10 MG tablet          Diagnoses         Codes Comments      Bipolar affective disorder, currently depressed, moderate    -  Primary ICD-10-CM: F31.32  ICD-9-CM: 296.52       Panic anxiety syndrome     ICD-10-CM: F41.0  ICD-9-CM: 300.01       Dysthymia     ICD-10-CM: F34.1  ICD-9-CM: 300.4       Medication refill     ICD-10-CM: Z76.0  ICD-9-CM: V68.1             Visit Diagnoses:    ICD-10-CM ICD-9-CM   1. Bipolar affective disorder, currently depressed, moderate  F31.32 296.52   2. Panic anxiety syndrome  F41.0 300.01   3. Dysthymia  F34.1 300.4   4. Medication refill  Z76.0 V68.1           Celexa and propranolol refilled.  We will increase Lamictal to 200 mg immediate release daily.  Previously educated upon side effects with use of these medications as well as when to present for emergency services, denies at this time. Instructions sent regarding use of medications attached to visit of initial prescribing date.  Sent resources related to animal service letters.  Sent work note.  Continue therapy.  Follow up this provider in 4 to 6 weeks or sooner if needed.    Discussed need marijuana use with current medications could exacerbate mental health symptoms, leading to further depression or psychosis. There is also some research to support psychotropics and mood stabilizers with marijuana use could reduce efficacy of medications. may increase side effects such as dizziness, drowsiness, confusion, and difficulty concentrating. Some people, especially the elderly, may also experience impairment in thinking, judgment, and motor coordination. Discussed with patient need to practice cessation, verbalized understanding.    Risks, benefits, alternatives discussed with patient including GI upset, nausea vomiting diarrhea,  theoretical decrease of seizure threshold predisposing the patient to a slightly higher seizure risk, headaches, sexual dysfunction, serotonin syndrome, bleeding risk, increased suicidality in patients 24 years and younger, switching to rosey/hypomania.  After discussion of these risks and benefits, the patient voiced understanding and agreed to proceed.     Discussed with pt that combining ETOH with prescribed antidepressants can worsen depression in addition to causing drowsiness, nausea, dizziness, impaired motor control, and increasing risk of cardiovascular events. The combination has the potential to be fatal. Therefore, it is highly recommended that the patient avoid ETOH use while on psychotropic medications. This APRN offered pt resources for ETOH treatment and pt declined          GOALS:  Short Term Goals: Patient will be compliant with medication, and patient will have no significant medication related side effects.  Patient will be engaged in psychotherapy as indicated.  Patient will report subjective improvement of symptoms.  Long term goals: To stabilize mood and treat/improve subjective symptoms, the patient will stay out of the hospital, the patient will be at an optimal level of functioning, and the patient will take all medications as prescribed.  The patient/guardian verbalized understanding and agreement with goals that were mutually set.      TREATMENT PLAN: Continue supportive psychotherapy efforts and medications as indicated.  Pharmacological and Non-Pharmacological treatment options discussed during today's visit. Patient/Guardian acknowledged and verbally consented with current treatment plan and was educated on the importance of compliance with treatment and follow-up appointments.      MEDICATION ISSUES:  Discussed medication options and treatment plan of prescribed medication as well as the risks, benefits, any black box warnings, and side effects including potential falls, possible  impaired driving, and metabolic adversities among others. Patient is agreeable to call the office with any worsening of symptoms or onset of side effects, or if any concerns or questions arise.  The contact information for the office is made available to the patient. Patient is agreeable to call 911 or go to the nearest ER should they begin having any SI/HI, or if any urgent concerns arise. No medication side effects or related complaints today.     MEDS ORDERED DURING VISIT:  New Medications Ordered This Visit   Medications    citalopram (CeleXA) 40 MG tablet     Sig: Take 1 tablet by mouth Daily.     Dispense:  30 tablet     Refill:  1    lamoTRIgine (LaMICtal) 100 MG tablet     Sig: Take 2 tablets by mouth Daily.     Dispense:  60 tablet     Refill:  1    propranolol (INDERAL) 10 MG tablet     Sig: Take 1 tablet by mouth Daily As Needed (anxiety). for anxiety     Dispense:  30 tablet     Refill:  0       Follow Up Appointment:   Return in about 4 weeks (around 7/30/2025) for Recheck.             This document has been electronically signed by RIVER Casper  July 2, 2025 14:01 EDT    Some of the data in this electronic note has been brought forward from a previous encounter, any necessary changes have been made, it has been reviewed by this APRN, and it is accurate.    Unable to complete visit using a video connection to the patient. A phone visit was used to complete this visits. Total time of discussion was 26 minutes.      Dictated Utilizing Dragon Dictation: Part of this note may be an electronic transcription/translation of spoken language to printed text using the Dragon Dictation System.

## 2025-07-15 DIAGNOSIS — Z76.0 MEDICATION REFILL: ICD-10-CM

## 2025-07-15 DIAGNOSIS — F31.32 BIPOLAR AFFECTIVE DISORDER, CURRENTLY DEPRESSED, MODERATE: ICD-10-CM

## 2025-07-16 RX ORDER — LAMOTRIGINE 100 MG/1
200 TABLET ORAL DAILY
Qty: 60 TABLET | Refills: 1 | OUTPATIENT
Start: 2025-07-16

## 2025-08-18 ENCOUNTER — OFFICE VISIT (OUTPATIENT)
Dept: OBSTETRICS AND GYNECOLOGY | Facility: CLINIC | Age: 25
End: 2025-08-18
Payer: COMMERCIAL

## 2025-08-18 VITALS
DIASTOLIC BLOOD PRESSURE: 82 MMHG | WEIGHT: 210.6 LBS | BODY MASS INDEX: 35.96 KG/M2 | SYSTOLIC BLOOD PRESSURE: 118 MMHG | HEIGHT: 64 IN

## 2025-08-18 DIAGNOSIS — E28.2 PCOS (POLYCYSTIC OVARIAN SYNDROME): Primary | ICD-10-CM

## 2025-08-18 DIAGNOSIS — R68.89 DIFFICULTY LOSING WEIGHT: ICD-10-CM

## 2025-08-18 DIAGNOSIS — N92.6 IRREGULAR PERIODS/MENSTRUAL CYCLES: ICD-10-CM

## 2025-08-18 DIAGNOSIS — Z86.39 H/O IRON DEFICIENCY: ICD-10-CM

## 2025-08-18 DIAGNOSIS — N93.9 ABNORMAL UTERINE BLEEDING (AUB): ICD-10-CM

## 2025-08-18 PROCEDURE — 99213 OFFICE O/P EST LOW 20 MIN: CPT

## 2025-08-18 RX ORDER — DROSPIRENONE AND ETHINYL ESTRADIOL 0.02-3(28)
1 KIT ORAL DAILY
Qty: 28 TABLET | Refills: 12 | Status: SHIPPED | OUTPATIENT
Start: 2025-08-18 | End: 2026-08-18